# Patient Record
Sex: MALE | Race: BLACK OR AFRICAN AMERICAN | Employment: OTHER | URBAN - METROPOLITAN AREA
[De-identification: names, ages, dates, MRNs, and addresses within clinical notes are randomized per-mention and may not be internally consistent; named-entity substitution may affect disease eponyms.]

---

## 2018-08-05 ENCOUNTER — HOSPITAL ENCOUNTER (INPATIENT)
Age: 78
LOS: 2 days | Discharge: HOME | DRG: 243 | End: 2018-08-07
Attending: EMERGENCY MEDICINE | Admitting: INTERNAL MEDICINE
Payer: MEDICARE

## 2018-08-05 DIAGNOSIS — G89.18 POST-OP PAIN: ICD-10-CM

## 2018-08-05 DIAGNOSIS — R55 NEAR SYNCOPE: ICD-10-CM

## 2018-08-05 DIAGNOSIS — I44.1 MOBITZ TYPE 2 SECOND DEGREE ATRIOVENTRICULAR BLOCK: Primary | ICD-10-CM

## 2018-08-05 LAB
ALBUMIN SERPL-MCNC: 4 G/DL (ref 3.5–5)
ALBUMIN/GLOB SERPL: 0.9 {RATIO} (ref 1.1–2.2)
ALP SERPL-CCNC: 114 U/L (ref 45–117)
ALT SERPL-CCNC: 48 U/L (ref 12–78)
ANION GAP SERPL CALC-SCNC: 14 MMOL/L (ref 5–15)
AST SERPL-CCNC: 48 U/L (ref 15–37)
BASOPHILS # BLD: 0 K/UL (ref 0–0.1)
BASOPHILS NFR BLD: 0 % (ref 0–1)
BILIRUB SERPL-MCNC: 0.5 MG/DL (ref 0.2–1)
BUN SERPL-MCNC: 27 MG/DL (ref 6–20)
BUN/CREAT SERPL: 15 (ref 12–20)
CALCIUM SERPL-MCNC: 9 MG/DL (ref 8.5–10.1)
CHLORIDE SERPL-SCNC: 103 MMOL/L (ref 97–108)
CO2 SERPL-SCNC: 19 MMOL/L (ref 21–32)
CREAT SERPL-MCNC: 1.78 MG/DL (ref 0.7–1.3)
DIFFERENTIAL METHOD BLD: NORMAL
EOSINOPHIL # BLD: 0.4 K/UL (ref 0–0.4)
EOSINOPHIL NFR BLD: 6 % (ref 0–7)
ERYTHROCYTE [DISTWIDTH] IN BLOOD BY AUTOMATED COUNT: 13.7 % (ref 11.5–14.5)
GLOBULIN SER CALC-MCNC: 4.3 G/DL (ref 2–4)
GLUCOSE SERPL-MCNC: 173 MG/DL (ref 65–100)
HCT VFR BLD AUTO: 45.9 % (ref 36.6–50.3)
HGB BLD-MCNC: 14.9 G/DL (ref 12.1–17)
IMM GRANULOCYTES # BLD: 0 K/UL (ref 0–0.04)
IMM GRANULOCYTES NFR BLD AUTO: 0 % (ref 0–0.5)
LYMPHOCYTES # BLD: 2.1 K/UL (ref 0.8–3.5)
LYMPHOCYTES NFR BLD: 30 % (ref 12–49)
MCH RBC QN AUTO: 29.3 PG (ref 26–34)
MCHC RBC AUTO-ENTMCNC: 32.5 G/DL (ref 30–36.5)
MCV RBC AUTO: 90.4 FL (ref 80–99)
MONOCYTES # BLD: 0.7 K/UL (ref 0–1)
MONOCYTES NFR BLD: 10 % (ref 5–13)
NEUTS SEG # BLD: 3.7 K/UL (ref 1.8–8)
NEUTS SEG NFR BLD: 54 % (ref 32–75)
NRBC # BLD: 0 K/UL (ref 0–0.01)
NRBC BLD-RTO: 0 PER 100 WBC
PLATELET # BLD AUTO: 206 K/UL (ref 150–400)
PMV BLD AUTO: 9.5 FL (ref 8.9–12.9)
POTASSIUM SERPL-SCNC: 4.1 MMOL/L (ref 3.5–5.1)
PROT SERPL-MCNC: 8.3 G/DL (ref 6.4–8.2)
RBC # BLD AUTO: 5.08 M/UL (ref 4.1–5.7)
SODIUM SERPL-SCNC: 136 MMOL/L (ref 136–145)
TROPONIN I SERPL-MCNC: <0.05 NG/ML
TROPONIN I SERPL-MCNC: <0.05 NG/ML
WBC # BLD AUTO: 6.9 K/UL (ref 4.1–11.1)

## 2018-08-05 PROCEDURE — 74011250636 HC RX REV CODE- 250/636: Performed by: INTERNAL MEDICINE

## 2018-08-05 PROCEDURE — 80053 COMPREHEN METABOLIC PANEL: CPT | Performed by: EMERGENCY MEDICINE

## 2018-08-05 PROCEDURE — 77030010547 HC BG URIN W/UMETER -A

## 2018-08-05 PROCEDURE — 93005 ELECTROCARDIOGRAM TRACING: CPT

## 2018-08-05 PROCEDURE — 99285 EMERGENCY DEPT VISIT HI MDM: CPT

## 2018-08-05 PROCEDURE — 74011250637 HC RX REV CODE- 250/637: Performed by: INTERNAL MEDICINE

## 2018-08-05 PROCEDURE — 65620000000 HC RM CCU GENERAL

## 2018-08-05 PROCEDURE — 85025 COMPLETE CBC W/AUTO DIFF WBC: CPT | Performed by: EMERGENCY MEDICINE

## 2018-08-05 PROCEDURE — 36415 COLL VENOUS BLD VENIPUNCTURE: CPT | Performed by: INTERNAL MEDICINE

## 2018-08-05 PROCEDURE — 74011000258 HC RX REV CODE- 258: Performed by: INTERNAL MEDICINE

## 2018-08-05 PROCEDURE — 74011000250 HC RX REV CODE- 250: Performed by: INTERNAL MEDICINE

## 2018-08-05 PROCEDURE — 84484 ASSAY OF TROPONIN QUANT: CPT | Performed by: EMERGENCY MEDICINE

## 2018-08-05 RX ORDER — DOPAMINE HYDROCHLORIDE 320 MG/100ML
0-20 INJECTION, SOLUTION INTRAVENOUS
Status: DISCONTINUED | OUTPATIENT
Start: 2018-08-05 | End: 2018-08-05

## 2018-08-05 RX ORDER — CARVEDILOL 3.12 MG/1
3.12 TABLET ORAL 2 TIMES DAILY WITH MEALS
Status: ON HOLD | COMMUNITY
End: 2018-08-07

## 2018-08-05 RX ORDER — PRAVASTATIN SODIUM 40 MG/1
40 TABLET ORAL
Status: DISCONTINUED | OUTPATIENT
Start: 2018-08-05 | End: 2018-08-07 | Stop reason: HOSPADM

## 2018-08-05 RX ORDER — ISOSORBIDE MONONITRATE 30 MG/1
30 TABLET, EXTENDED RELEASE ORAL DAILY
Status: DISCONTINUED | OUTPATIENT
Start: 2018-08-06 | End: 2018-08-07 | Stop reason: HOSPADM

## 2018-08-05 RX ORDER — CHOLECALCIFEROL TAB 125 MCG (5000 UNIT) 125 MCG
40000 TAB ORAL
COMMUNITY

## 2018-08-05 RX ORDER — TRAVOPROST OPHTHALMIC SOLUTION 0.04 MG/ML
1 SOLUTION OPHTHALMIC EVERY EVENING
COMMUNITY

## 2018-08-05 RX ORDER — ONDANSETRON 2 MG/ML
4 INJECTION INTRAMUSCULAR; INTRAVENOUS
Status: DISCONTINUED | OUTPATIENT
Start: 2018-08-05 | End: 2018-08-06

## 2018-08-05 RX ORDER — DOPAMINE HYDROCHLORIDE 320 MG/100ML
0-20 INJECTION, SOLUTION INTRAVENOUS
Status: DISCONTINUED | OUTPATIENT
Start: 2018-08-05 | End: 2018-08-06

## 2018-08-05 RX ORDER — SULFAMETHOXAZOLE AND TRIMETHOPRIM 800; 160 MG/1; MG/1
1 TABLET ORAL 2 TIMES DAILY
COMMUNITY
Start: 2018-08-01 | End: 2018-08-14

## 2018-08-05 RX ORDER — GUAIFENESIN 100 MG/5ML
162 LIQUID (ML) ORAL DAILY
Status: DISCONTINUED | OUTPATIENT
Start: 2018-08-06 | End: 2018-08-07 | Stop reason: HOSPADM

## 2018-08-05 RX ORDER — CIMETIDINE 400 MG/1
400 TABLET, FILM COATED ORAL
COMMUNITY

## 2018-08-05 RX ORDER — ISOSORBIDE MONONITRATE 30 MG/1
30 TABLET, EXTENDED RELEASE ORAL DAILY
COMMUNITY

## 2018-08-05 RX ORDER — PRAVASTATIN SODIUM 40 MG/1
40 TABLET ORAL
COMMUNITY

## 2018-08-05 RX ORDER — LATANOPROST 50 UG/ML
1 SOLUTION/ DROPS OPHTHALMIC EVERY EVENING
Status: DISCONTINUED | OUTPATIENT
Start: 2018-08-05 | End: 2018-08-07 | Stop reason: HOSPADM

## 2018-08-05 RX ORDER — QUININE SULFATE 324 MG/1
324 CAPSULE ORAL
COMMUNITY

## 2018-08-05 RX ADMIN — DOPAMINE HYDROCHLORIDE IN DEXTROSE 5 MCG/KG/MIN: 3.2 INJECTION, SOLUTION INTRAVENOUS at 15:30

## 2018-08-05 RX ADMIN — LATANOPROST 1 DROP: 50 SOLUTION OPHTHALMIC at 17:39

## 2018-08-05 RX ADMIN — SACUBITRIL AND VALSARTAN 1 TABLET: 97; 103 TABLET, FILM COATED ORAL at 17:39

## 2018-08-05 RX ADMIN — ONDANSETRON 4 MG: 2 INJECTION INTRAMUSCULAR; INTRAVENOUS at 16:43

## 2018-08-05 RX ADMIN — ISOPROTERENOL HYDROCHLORIDE 1 MCG/MIN: 0.2 INJECTION, SOLUTION INTRAMUSCULAR; INTRAVENOUS at 21:41

## 2018-08-05 RX ADMIN — PRAVASTATIN SODIUM 40 MG: 40 TABLET ORAL at 22:00

## 2018-08-05 NOTE — H&P
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

## 2018-08-05 NOTE — PROGRESS NOTES
Problem: Falls - Risk of  Goal: *Absence of Falls  Document Chacorta Fall Risk and appropriate interventions in the flowsheet. Outcome: Progressing Towards Goal  Fall Risk Interventions:  Mobility Interventions: Patient to call before getting OOB                            Problem: Pressure Injury - Risk of  Goal: *Prevention of pressure injury  Document Rupert Scale and appropriate interventions in the flowsheet.    Outcome: Progressing Towards Goal  Pressure Injury Interventions:

## 2018-08-05 NOTE — PROGRESS NOTES
Problem: Falls - Risk of  Goal: *Absence of Falls  Document Chacorta Fall Risk and appropriate interventions in the flowsheet. Outcome: Progressing Towards Goal  Fall Risk Interventions:                               Problem: Pressure Injury - Risk of  Goal: *Prevention of pressure injury  Document Rupert Scale and appropriate interventions in the flowsheet.   Outcome: Progressing Towards Goal  Pressure Injury Interventions:

## 2018-08-05 NOTE — IP AVS SNAPSHOT
1111 [facility] [address] [place] [address] 
[phone] Patient: Alicia Shoemaker MRN: [id number] QFJ: About your hospitalization You were admitted on:  2018 You last received care in the:  [address] You were discharged on:  2018 Why you were hospitalized Your primary diagnosis was:  Not on File Your diagnoses also included:  Heart Block Av Second Degree Follow-up Information Follow up With Details Comments Contact Info Sloan Rosas MD On 2018 Hospital f/u PCP appointment 18 @ 1:45 p.m. [address] [place] [id number] [phone] FOLLOW UP VISIT: Dr Bora Fitzgerald office will call to schedule your pacemaker wound check. Follow up with Dr Abigail Rodriguez in 1 month [phone] Discharge Orders None A check son indicates which time of day the medication should be taken. My Medications START taking these medications Instructions Each Dose to Equal  
 Morning Noon Evening Bedtime HYDROcodone-acetaminophen 5-325 mg per tablet Commonly known as:  Bertha Kind Your last dose was: Your next dose is: Take 1 Tab by mouth every four (4) hours as needed. Max Daily Amount: 6 Tabs. Indications: Pain 1 Tab CHANGE how you take these medications Instructions Each Dose to Equal  
 Morning Noon Evening Bedtime BACTRIM -800 mg per tablet Generic drug:  trimethoprim-sulfamethoxazole What changed:  Another medication with the same name was removed. Continue taking this medication, and follow the directions you see here. Your last dose was: Your next dose is: Take 1 Tab by mouth two (2) times a day. 1 Tab  
    
   
   
   
  
 carvedilol 6.25 mg tablet Commonly known as:  Jinx Re What changed:   
- medication strength - how much to take Your last dose was: Your next dose is: Take 1 Tab by mouth two (2) times daily (with meals). 6.25 mg CONTINUE taking these medications Instructions Each Dose to Equal  
 Morning Noon Evening Bedtime  
 aspirin 81 mg chewable tablet Your last dose was: Your next dose is: Take 2 Tabs by mouth daily. 162 mg  
    
   
   
   
  
 cimetidine 400 mg tablet Commonly known as:  TAGAMET Your last dose was: Your next dose is: Take 400 mg by mouth daily as needed. 400 mg ENTRESTO  mg tablet Generic drug:  sacubitril-valsartan Your last dose was: Your next dose is: Take 1 Tab by mouth two (2) times a day. 1 Tab  
    
   
   
   
  
 isosorbide mononitrate ER 30 mg tablet Commonly known as:  IMDUR Your last dose was: Your next dose is: Take 30 mg by mouth daily. 30 mg  
    
   
   
   
  
 pravastatin 40 mg tablet Commonly known as:  PRAVACHOL Your last dose was: Your next dose is: Take 40 mg by mouth nightly. 40 mg  
    
   
   
   
  
 QUALAQUIN 324 mg capsule Generic drug:  quiNINE Your last dose was: Your next dose is: Take 324 mg by mouth nightly. 324 mg  
    
   
   
   
  
 TRAVATAN Z 0.004 % ophthalmic solution Generic drug:  travoprost  
   
Your last dose was: Your next dose is:    
   
   
 Administer 1 Drop to both eyes every evening. 1 Drop * VITAMIN D3 1,000 unit tablet Generic drug:  cholecalciferol Your last dose was: Your next dose is: Take  by mouth daily. * cholecalciferol (VITAMIN D3) 5,000 unit Tab tablet Commonly known as:  VITAMIN D3 Your last dose was: Your next dose is: Take 40,000 Units by mouth every thirty (30) days. First day of month 87768 Units * Notice: This list has 2 medication(s) that are the same as other medications prescribed for you. Read the directions carefully, and ask your doctor or other care provider to review them with you. STOP taking these medications TYLENOL EX STR ARTHRITIS PAIN 500 mg tablet Generic drug:  acetaminophen Where to Get Your Medications Information on where to get these meds will be given to you by the nurse or doctor. ! Ask your nurse or doctor about these medications  
  carvedilol 6.25 mg tablet HYDROcodone-acetaminophen 5-325 mg per tablet Opioid Education Prescription Opioids: What You Need to Know: 
 
Prescription opioids can be used to help relieve moderate-to-severe pain and are often prescribed following a surgery or injury, or for certain health conditions. These medications can be an important part of treatment but also come with serious risks. Opioids are strong pain medicines. Examples include hydrocodone, oxycodone, fentanyl, and morphine. Heroin is an example of an illegal opioid. It is important to work with your health care provider to make sure you are getting the safest, most effective care. WHAT ARE THE RISKS AND SIDE EFFECTS OF OPIOID USE? Prescription opioids carry serious risks of addiction and overdose, especially with prolonged use. An opioid overdose, often marked by slow breathing, can cause sudden death. The use of prescription opioids can have a number of side effects as well, even when taken as directed. · Tolerance-meaning you might need to take more of a medication for the same pain relief · Physical dependence-meaning you have symptoms of withdrawal when the medication is stopped. Withdrawal symptoms can include nausea, sweating, chills, diarrhea, stomach cramps, and muscle aches.   Withdrawal can last up to several weeks, depending on which drug you took and how long you took it. · Increased sensitivity to pain · Constipation · Nausea, vomiting, and dry mouth · Sleepiness and dizziness · Confusion · Depression · Low levels of testosterone that can result in lower sex drive, energy, and strength · Itching and sweating RISKS ARE GREATER WITH:      
· History of drug misuse, substance use disorder, or overdose · Mental health conditions (such as depression or anxiety) · Sleep apnea · Older age (72 years or older) · Pregnancy Avoid alcohol while taking prescription opioids. Also, unless specifically advised by your health care provider, medications to avoid include: · Benzodiazepines (such as Xanax or Valium) · Muscle relaxants (such as Soma or Flexeril) · Hypnotics (such as Ambien or Lunesta) · Other prescription opioids KNOW YOUR OPTIONS Talk to your health care provider about ways to manage your pain that don't involve prescription opioids. Some of these options may actually work better and have fewer risks and side effects. Options may include: 
· Pain relievers such as acetaminophen, ibuprofen, and naproxen · Some medications that are also used for depression or seizures · Physical therapy and exercise · Counseling to help patients learn how to cope better with triggers of pain and stress. · Application of heat or cold compress · Massage therapy · Relaxation techniques Be Informed Make sure you know the name of your medication, how much and how often to take it, and its potential risks & side effects. IF YOU ARE PRESCRIBED OPIOIDS FOR PAIN: 
· Never take opioids in greater amounts or more often than prescribed. Remember the goal is not to be pain-free but to manage your pain at a tolerable level. · Follow up with your primary care provider to: · Work together to create a plan on how to manage your pain. · Talk about ways to help manage your pain that don't involve prescription opioids. · Talk about any and all concerns and side effects. · Help prevent misuse and abuse. · Never sell or share prescription opioids · Help prevent misuse and abuse. · Store prescription opioids in a secure place and out of reach of others (this may include visitors, children, friends, and family). · Safely dispose of unused/unwanted prescription opioids: Find your community drug take-back program or your pharmacy mail-back program, or flush them down the toilet, following guidance from the Food and Drug Administration (www.fda.gov/Drugs/ResourcesForYou). · Visit www.cdc.gov/drugoverdose to learn about the risks of opioid abuse and overdose. · If you believe you may be struggling with addiction, tell your health care provider and ask for guidance or call IDRI (Infectious Disease Research Institute) at 1-606-772-DISP. Discharge Instructions Heart Blocks: Care Instructions Your Care Instructions A heart block is a problem with your heart's electrical system. Normally, a small area of the heart (sinus node) creates the electrical signals that cause the heart to beat in a timed and regular way. A heart block occurs when the signal is blocked. This disrupts the heartbeat. A heart block does not mean that blood flow to the heart is blocked. There are three types of heart blocks. In a first-degree heart block, the signal is slower than normal. But the heart rate is normal, and the heart usually is not damaged. Some medicines may cause a first-degree heart block. In a second-degree heart block, some signals do not reach the lower chambers of the heart. This can cause the heart to skip a beat or have an abnormal rhythm. In a third-degree heart block, the signal is completely blocked from reaching the lower chambers.  This can cause the heart to slow down a lot or even stop beating. It is a very serious condition. A first-degree heart block usually does not cause problems and does not need treatment. Second- and third-degree heart blocks can be treated by placing a pacemaker under your skin. The pacemaker is connected to your heart with thin wires. It helps your heart to beat in an even rhythm. Follow-up care is a key part of your treatment and safety. Be sure to make and go to all appointments, and call your doctor if you are having problems. It's also a good idea to know your test results and keep a list of the medicines you take. How can you care for yourself at home? · If you feel lightheaded, sit or lie down to avoid injury that might occur if you faint and fall. · Get regular exercise. Try for 30 minutes on most days of the week. If you do not have other heart problems, you likely do not have limits on the type or level of activity that you can do. You may want to walk, swim, bike, or do other activities. Ask your doctor what level of exercise is safe for you. · Get plenty of sleep and rest. If you get overtired, your changes in heart rate or rhythm may be more severe or occur more often. · If you received a pacemaker or an implantable cardioverter-defibrillator (ICD), you will get more information about it. · Wear medical alert jewelry that describes your condition and says you have a pacemaker or ICD. You can buy this at most drugstores. When should you call for help? Call 911 anytime you think you may need emergency care. For example, call if: 
  · You passed out (lost consciousness).  
  · You have symptoms of a heart attack. These may include: ¨ Chest pain or pressure, or a strange feeling in the chest. 
¨ Sweating. ¨ Shortness of breath. ¨ Nausea or vomiting. ¨ Pain, pressure, or a strange feeling in the back, neck, jaw, or upper belly or in one or both shoulders or arms. ¨ Lightheadedness or sudden weakness. ¨ A fast or irregular heartbeat. After you call 911, the  may tell you to chew 1 adult-strength or 2 to 4 low-dose aspirin. Wait for an ambulance. Do not try to drive yourself.  
 Call your doctor now or seek immediate medical care if: 
  · You are dizzy or lightheaded, or you feel like you may faint.  
  · You have new or increased shortness of breath.  
 Watch closely for changes in your health, and be sure to contact your doctor if you have any problems. Where can you learn more? Go to http://akiko-josselin.info/. Enter M786 in the search box to learn more about \"Heart Blocks: Care Instructions. \" Current as of: December 6, 2017 Content Version: 11.7 © 3460-0012 Open Wager. Care instructions adapted under license by Carbon Objects (which disclaims liability or warranty for this information). If you have questions about a medical condition or this instruction, always ask your healthcare professional. [place] [address] any warranty or liability for your use of this information. Red Advertising Announcement We are excited to announce that we are making your provider's discharge notes available to you in Red Advertising. You will see these notes when they are completed and signed by the physician that discharged you from your recent hospital stay. If you have any questions or concerns about any information you see in Red Advertising, please call the Health Information Department where you were seen or reach out to your Primary Care Provider for more information about your plan of care. Introducing [facility]! Cameron Gómez introduces Red Advertising patient portal. Now you can access parts of your medical record, email your doctor's office, and request medication refills online. 1. In your internet browser, go to [email or web address] 2. Click on the First Time User? Click Here link in the Sign In box. You will see the New Member Sign Up page. 3. Enter your ApogeeInvent Access Code exactly as it appears below. You will not need to use this code after youve completed the sign-up process. If you do not sign up before the expiration date, you must request a new code. · ApogeeInvent Access Code: [email or web address] Expires: 11/3/2018 12:02 PM 
 
4. Enter the last four digits of your Social Security Number (xxxx) and Date of Birth (mm/dd/yyyy) as indicated and click Submit. You will be taken to the next sign-up page. 5. Create a ApogeeInvent ID. This will be your ApogeeInvent login ID and cannot be changed, so think of one that is secure and easy to remember. 6. Create a ApogeeInvent password. You can change your password at any time. 7. Enter your Password Reset Question and Answer. This can be used at a later time if you forget your password. 8. Enter your e-mail address. You will receive e-mail notification when new information is available in [address] 9. Click Sign Up. You can now view and download portions of your medical record. 10. Click the Download Summary menu link to download a portable copy of your medical information. If you have questions, please visit the Frequently Asked Questions section of the ApogeeInvent website. Remember, ApogeeInvent is NOT to be used for urgent needs. For medical emergencies, dial 911. Now available from your iPhone and Android! Introducing Chetan Silver As a [employer] patient, I wanted to make you aware of our electronic visit tool called Chetan Shawyeceniavinayak. [employer] 24/7 allows you to connect within minutes with a medical provider 24 hours a day, seven days a week via a mobile device or tablet or logging into a secure website from your computer. You can access Chetan Silver from anywhere in the [place].  
 
A virtual visit might be right for you when you have a simple condition and feel like you just dont want to get out of bed, or cant get away from work for an appointment, when your regular [facility] provider is not available (evenings, weekends or holidays), or when youre out of town and need minor care. Electronic visits cost only $49 and if the [employer] [facility] 24/7 provider determines a prescription is needed to treat your condition, one can be electronically transmitted to a nearby pharmacy*. Please take a moment to enroll today if you have not already done so. The enrollment process is free and takes just a few minutes. To enroll, please download the Moneyspyder/Zapa linda to your tablet or phone, or visit [email or web address] to enroll on your computer. And, as an [address] [place] [address] patient with a Pure Elegance TV account, the results of your visits will be scanned into your electronic medical record and your primary care provider will be able to view the scanned results. We urge you to continue to see your regular [facility] provider for your ongoing medical care. And while your primary care provider may not be the one available when you seek a Y[employer] Intelligenceyeceniafin virtual visit, the peace of mind you get from getting a real diagnosis real time can be priceless. For more information on StartSampling, view our Frequently Asked Questions (FAQs) at [email or web address]. Sincerely, 
 
Gene Goins MD 
Chief Medical Officer [employer] *:  certain medications cannot be prescribed via [employer] Intelligenceyeceniafin Providers Seen During Your Hospitalization Provider Specialty Primary office phone Shala Orr MD Emergency Medicine [phone] René Jain MD Cardiology [phone] Your Primary Care Physician (PCP) Primary Care Physician Office Phone Office Fax Telly TOMAS [phone] You are allergic to the following No active allergies Recent Documentation Height Weight BMI Smoking Status 1.524 m 73.2 kg 31.52 kg/m2 Former Smoker Emergency Contacts Name Discharge Info Relation Home Work Mobile Monica Quiros CAREGIVER [3] Spouse [3] [phone] Patient Belongings The following personal items are in your possession at time of discharge: 
  Dental Appliances: Lowers, Uppers         Home Medications: None   Jewelry: Ring, Sent home  Clothing: With patient    Other Valuables: None Discharge Instructions Attachments/References BRADYCARDIA PACEMAKER: POST-OP (ENGLISH) Patient Handouts Pacemaker Placement: What to Expect at [facility] Your Recovery Pacemaker placement is surgery to put a pacemaker in your chest. This surgery may be done if you have bradycardia (a slow heart rate). A pacemaker is a small, battery-powered device. It sends electrical signals to the heart. These signals work to keep the heartbeat steady. Thin wires, called leads, carry the signals from the pacemaker to the heart. A pacemaker can prevent or reduce dizziness, fainting, and shortness of breath caused by a slow or unsteady heartbeat. Your chest may be sore where the doctor made the cut (incision) and put in the pacemaker. You also may have a bruise and mild swelling. These symptoms usually get better in 1 to 2 weeks. You may feel a hard ridge along the incision. This usually gets softer in the months after surgery. You may be able to see or feel the outline of the pacemaker under your skin. You will probably be able to go back to work or your usual routine 1 to 2 weeks after surgery. Pacemaker batteries usually last 5 to 15 years. Your doctor will talk to you about how often you will need to have your pacemaker checked. You can safely use most household and office electronics. This includes things such as kitchen appliances, electric power tools, and computers.  You will need to stay away from things with strong magnetic and electrical fields. This includes things such as an MRI machine (unless your pacemaker is safe for an MRI), welding equipment, and power generators. You can use a cell phone, but keep it at least 6 inches away from your pacemaker. Check with your doctor about what you need to stay away from, what you need to use with care, and what is okay to use. This care sheet gives you a general idea about how long it will take for you to recover. But each person recovers at a different pace. Follow the steps below to get better as quickly as possible. How can you care for yourself at home? Activity 
  · Rest when you feel tired.  
  · Be active. Walking is a good choice.  
  · For 4 to 6 weeks: ¨ Avoid activities that strain your chest or upper arm muscles. This includes pushing a  or vacuum, or mopping floors. It also includes swimming, or swinging a golf club or tennis racquet. ¨ Do not raise your arm (the one on the side of your body where the pacemaker is located) above your shoulder. ¨ Allow your body to heal. Don't move quickly or lift anything heavy until you are feeling better.  
  · Many people are able to return to work within 1 to 2 weeks after surgery.  
  · Ask your doctor when it is okay for you to have sex. Diet 
  · You can eat your normal diet. If your stomach is upset, try bland, low-fat foods like plain rice, broiled chicken, toast, and yogurt. Medicines 
  · Your doctor will tell you if and when you can restart your medicines. He or she will also give you instructions about taking any new medicines.  
  · If you take aspirin or some other blood thinner, be sure to talk to your doctor. He or she will tell you if and when to start taking this medicine again. Make sure that you understand exactly what your doctor wants you to do.  
  · Be safe with medicines. Read and follow all instructions on the label. ¨ If the doctor gave you a prescription medicine for pain, take it as prescribed. ¨ If you are not taking a prescription pain medicine, ask your doctor if you can take an over-the-counter medicine. ¨ Do not take aspirin, ibuprofen (Advil, Motrin), naproxen (Aleve), or other nonsteroidal anti-inflammatory drugs (NSAIDs) unless your doctor says it is okay.  
  · If your doctor prescribed antibiotics, take them as directed. Do not stop taking them just because you feel better. You need to take the full course of antibiotics. Incision care 
  · If you have strips of tape on the incision, leave the tape on for a week or until it falls off.  
  · Keep the incision dry while it heals. Your doctor may recommend sponge baths for about 7 days, but do not get the incision wet. Your doctor will let you know when you may take showers. After a shower, pat the incision dry.  
  · Don't use hydrogen peroxide or alcohol on the incision, which can slow healing. You may cover the area with a gauze bandage if it oozes fluid or rubs against clothing. Change the bandage every day.  
  · Do not take a bath or get into a hot tub for the first 2 weeks, or until your doctor tells you it is okay. Other instructions 
  · Keep a medical ID card with you at all times that says you have a pacemaker. The card should include the  and model information.  
  · Wear medical alert jewelry stating that you have a pacemaker. You can buy this at most drugstores.  
  · Check your pulse as directed by your doctor.  
  · Have your pacemaker checked as often as your doctor recommends. In some cases, this may be done over the phone or the Internet. Your doctor will give you instructions about how to do this. Follow-up care is a key part of your treatment and safety. Be sure to make and go to all appointments, and call your doctor if you are having problems. It's also a good idea to know your test results and keep a list of the medicines you take. When should you call for help? Call 911 anytime you think you may need emergency care. For example, call if: 
  · You passed out (lost consciousness).  
  · You have trouble breathing.  
 Call your doctor now or seek immediate medical care if: 
  · You are dizzy or light-headed, or you feel like you may faint.  
  · You have pain that does not get better after you take pain medicine.  
  · You hear an alarm or feel a vibration from your pacemaker.  
  · You have loose stitches, or your incision comes open.  
  · Bright red blood has soaked through the bandage over your incision.  
  · You have signs of infection, such as: 
¨ Increased pain, swelling, warmth, or redness. ¨ Red streaks leading from the incision. ¨ Pus draining from the incision. ¨ A fever.  
 Watch closely for changes in your health, and be sure to contact your doctor if: 
  · You have any problems with your pacemaker. Where can you learn more? Go to http://akiko-josselin.info/. Enter G550 in the search box to learn more about \"Pacemaker Placement: What to Expect at Home. \" Current as of: December 6, 2017 Content Version: 11.7 © 1379-1019 Flipora. Care instructions adapted under license by Anago (which disclaims liability or warranty for this information). If you have questions about a medical condition or this instruction, always ask your healthcare professional. [address] any warranty or liability for your use of this information. Please provide this summary of care documentation to your next provider. Signatures-by signing, you are acknowledging that this After Visit Summary has been reviewed with you and you have received a copy. Patient Signature:  ____________________________________________________________ Date:  ____________________________________________________________  
  
SaraHelen [facility]  Provider Signature: ____________________________________________________________ Date:  ____________________________________________________________

## 2018-08-05 NOTE — PROGRESS NOTES
1445 TRANSFER - IN REPORT: Verbal report received from Rita(name) on Justice Mina  being received from ED(unit) for routine progression of care  Report consisted of patients Situation, Background, Assessment and   Recommendations(SBAR). Information from the following report(s) SBAR, ED Summary, Intake/Output, MAR, Recent Results, Med Rec Status and Cardiac Rhythm 2nd Degree AVB Type 2 with BBB was reviewed with the receiving nurse. Opportunity for questions and clarification was provided. Assessment completed upon patients arrival to unit and care assumed. 1517 Pt arrived to unit from ED. Pt A&O x 4, c/o dizziness with movement. Pt and wife updated on plan of care and have no questions or concerns at this time. Primary Nurse Tarik Mcqueen, RN and Sona Osei RN performed a dual skin assessment on this patient No impairment noted  Rupert score is 20 Patient resting on VersaCare bed.    1520 Consent obtained for pacemaker placement    1530 Dopamine gtt started, see MAR for titration details. 1620 Pt c/o nausea with one episode of vomiting. Dr. Nidia Lott notified. Orders received for PRN Zofran and repeat Troponin. 1627 Pt converted to Sinus Teola Cheeks. 1650 Troponin drawn via peripheral venipuncture and sent to lab    1810 Pt in in out of junctional rhythm from sinus noe. Pt asymptomatic. Dr. Mercedes Gomez paged. No new orders received. 1930 Bedside shift change report given to Jaden (oncoming nurse) by Ruth (offgoing nurse). Report included the following information SBAR, Intake/Output, MAR, Recent Results and Cardiac Rhythm Sinus Teola Cheeks.

## 2018-08-05 NOTE — ROUTINE PROCESS
TRANSFER - OUT REPORT:    Verbal report given to [facility] RN(name) on Kassie Woods  being transferred to CCU(unit) for routine progression of care       Report consisted of patients Situation, Background, Assessment and   Recommendations(SBAR). Information from the following report(s) SBAR, ED Summary, [facility] and Recent Results was reviewed with the receiving nurse. Lines:   Peripheral IV 08/05/18 Left Antecubital (Active)   Site Assessment Clean, dry, & intact 8/5/2018 12:28 PM   Phlebitis Assessment 0 8/5/2018 12:28 PM   Infiltration Assessment 0 8/5/2018 12:28 PM   Dressing Status Clean, dry, & intact 8/5/2018 12:28 PM       Peripheral IV 08/05/18 Right Forearm (Active)   Site Assessment Clean, dry, & intact 8/5/2018 12:28 PM   Phlebitis Assessment 0 8/5/2018 12:28 PM   Infiltration Assessment 0 8/5/2018 12:28 PM   Dressing Status Clean, dry, & intact 8/5/2018 12:28 PM        Opportunity for questions and clarification was provided.       Patient transported with:   Monitor  Registered Nurse

## 2018-08-05 NOTE — PROGRESS NOTES
Admission Medication Reconciliation:    Information obtained from:  Patient, his wife, [facility], patient's list    Comments/Recommendations: Updated PTA meds/reviewed patient's allergies. 1)  Patient had his morning medications. Wife had a medication list that was partially updated. Used [employer] data to confirm additional medications with patient and his wife. 2)  Medication changes (since last review): Added  Armorelianena Hien (verified  with [facility])  -Travatan    -Isosorbide mononitrate  -Coreg  -Cimetidine  -Quinine   -Pravastatin    Adjusted  --Vit D3 (takes 8 tab of 5000units at the first of each month)    Removed  -Simvastatin (replaced by pravastatin)  -Plavix (D/C by provider)  -Amlodipine/benazepril (replaced by [facility])      3) Bacterium DS, per patient, prescribed recently for \"prostate infection\". Allergies:  Review of patient's allergies indicates no known allergies. Significant PMH/Disease States:   Past Medical History:   Diagnosis Date    Coagulation defects     coumadin therapy    Hypertension     Kidney stones     PUD (peptic ulcer disease) 2005    Stroke ([facility] [address] 1997, 2000    blurred vision right eye       Chief Complaint for this Admission:    Chief Complaint   Patient presents with    Nausea    Dizziness       Prior to Admission Medications:   Prior to Admission Medications   Prescriptions Last Dose Informant Patient Reported? Taking?   acetaminophen (TYLENOL EX STR ARTHRITIS PAIN) 500 mg tablet   Yes Yes   Sig: Take 1,000 mg by mouth every six (6) hours as needed. aspirin 81 mg chewable tablet 8/4/2018 at AM  No Yes   Sig: Take 2 Tabs by mouth daily. carvedilol (COREG) 3.125 mg tablet 8/5/2018 at AM  Yes Yes   Sig: Take 3.125 mg by mouth two (2) times daily (with meals). cholecalciferol (VITAMIN D3) 1,000 unit tablet   Yes No   Sig: Take  by mouth daily.    cholecalciferol, VITAMIN D3, (VITAMIN D3) 5,000 unit tab tablet 8/1/2018  Yes Yes   Sig: Take 40,000 Units by mouth every thirty (30) days. First day of month   cimetidine (TAGAMET) 400 mg tablet   Yes Yes   Sig: Take 400 mg by mouth daily as needed. isosorbide mononitrate ER (IMDUR) 30 mg tablet 8/5/2018 at Unknown time  Yes Yes   Sig: Take 30 mg by mouth daily. pravastatin (PRAVACHOL) 40 mg tablet 8/4/2018 at Unknown time  Yes Yes   Sig: Take 40 mg by mouth nightly. quiNINE (QUALAQUIN) 324 mg capsule 8/4/2018 at Unknown time  Yes Yes   Sig: Take 324 mg by mouth nightly. sacubitril-valsartan (ENTRESTO) 97 mg/103 mg tablet   Yes Yes   Sig: Take 1 Tab by mouth two (2) times a day. travoprost (TRAVATAN Z) 0.004 % ophthalmic solution 8/4/2018 at Unknown time  Yes Yes   Sig: Administer 1 Drop to both eyes every evening. trimethoprim-sulfamethoxazole (BACTRIM DS) 160-800 mg per tablet 8/5/2018 at Unknown time  Yes Yes   Sig: Take 1 Tab by mouth two (2) times a day.       Facility-Administered Medications: None

## 2018-08-05 NOTE — IP AVS SNAPSHOT
[address] [facility] [address] [place] [address] 
[phone] Patient: Oli Sender MRN: [id number] TCY:[id number] A check son indicates which time of day the medication should be taken. My Medications START taking these medications Instructions Each Dose to Equal  
 Morning Noon Evening Bedtime HYDROcodone-acetaminophen 5-325 mg per tablet Commonly known as:  Esmer Bah Your last dose was: Your next dose is: Take 1 Tab by mouth every four (4) hours as needed. Max Daily Amount: 6 Tabs. Indications: Pain 1 Tab CHANGE how you take these medications Instructions Each Dose to Equal  
 Morning Noon Evening Bedtime BACTRIM -800 mg per tablet Generic drug:  trimethoprim-sulfamethoxazole What changed:  Another medication with the same name was removed. Continue taking this medication, and follow the directions you see here. Your last dose was: Your next dose is: Take 1 Tab by mouth two (2) times a day. 1 Tab  
    
   
   
   
  
 carvedilol 6.25 mg tablet Commonly known as:  Juan Miguel Boucher What changed:   
- medication strength 
- how much to take Your last dose was: Your next dose is: Take 1 Tab by mouth two (2) times daily (with meals). 6.25 mg CONTINUE taking these medications Instructions Each Dose to Equal  
 Morning Noon Evening Bedtime  
 aspirin 81 mg chewable tablet Your last dose was: Your next dose is: Take 2 Tabs by mouth daily. 162 mg  
    
   
   
   
  
 cimetidine 400 mg tablet Commonly known as:  TAGAMET Your last dose was: Your next dose is: Take 400 mg by mouth daily as needed. 400 mg ENTRESTO  mg tablet Generic drug:  sacubitril-valsartan Your last dose was: Your next dose is: Take 1 Tab by mouth two (2) times a day. 1 Tab  
    
   
   
   
  
 isosorbide mononitrate ER 30 mg tablet Commonly known as:  IMDUR Your last dose was: Your next dose is: Take 30 mg by mouth daily. 30 mg  
    
   
   
   
  
 pravastatin 40 mg tablet Commonly known as:  PRAVACHOL Your last dose was: Your next dose is: Take 40 mg by mouth nightly. 40 mg  
    
   
   
   
  
 QUALAQUIN 324 mg capsule Generic drug:  quiNINE Your last dose was: Your next dose is: Take 324 mg by mouth nightly. 324 mg  
    
   
   
   
  
 TRAVATAN Z 0.004 % ophthalmic solution Generic drug:  travoprost  
   
Your last dose was: Your next dose is:    
   
   
 Administer 1 Drop to both eyes every evening. 1 Drop * VITAMIN D3 1,000 unit tablet Generic drug:  cholecalciferol Your last dose was: Your next dose is: Take  by mouth daily. * cholecalciferol (VITAMIN D3) 5,000 unit Tab tablet Commonly known as:  VITAMIN D3 Your last dose was: Your next dose is: Take 40,000 Units by mouth every thirty (30) days. First day of month 60243 Units * Notice: This list has 2 medication(s) that are the same as other medications prescribed for you. Read the directions carefully, and ask your doctor or other care provider to review them with you. STOP taking these medications TYLENOL EX STR ARTHRITIS PAIN 500 mg tablet Generic drug:  acetaminophen Where to Get Your Medications Information on where to get these meds will be given to you by the nurse or doctor. ! Ask your nurse or doctor about these medications  
  carvedilol 6.25 mg tablet HYDROcodone-acetaminophen 5-325 mg per tablet

## 2018-08-05 NOTE — H&P
[address]   HISTORY AND PHYSICAL      Chinmay Broderick  MR#: [id number]  : 1940  ACCOUNT #: [de-identified]   ADMIT DATE: 2018    HISTORY OF PRESENT ILLNESS:  The patient is a 49-year-old man who came here to the emergency room today because he became dizzy at [employer]. He was lightheaded but did not have syncope. He was found to be in a profound bradycardia with episodes of 2:1 AV conduction with an escape rhythm. His blood pressure is stable. He is lying flat and feels fine right now and he initially thought his symptoms were coming from his new medication which turned out to be an antibiotic that he was given for prostatism. MEDICATIONS:  His usual medications: At home at this time are as follows:  Sulfamethoxazole, trimethoprim as mentioned, cholecalciferol, aspirin, clopidogrel, amlodipine, benazepril, simvastatin and Tylenol. ALLERGIES:  NO ALLERGIES. REVIEW OF SYSTEMS:  Patient denies chest pain, diaphoresis, syncope, orthopnea, paroxysmal nocturnal dyspnea, edema or claudication. He has no other cardiac concerns at this time. Otherwise unremarkable. PAST MEDICAL HISTORY:  Includes:  Prostate cancer, kidney stones, hypertension and a nonischemic cardiomyopathy, ejection fraction in 2016 was 40%. He has also had 2 strokes. In 2013, he had circumflex artery stenting, has nonfunctioning left kidney and left bundle branch block as well. PHYSICAL EXAMINATION:  GENERAL:  Today, this is a well-developed, alert gentleman in no distress, although he does admit he still feels lightheaded and woozy. VITAL SIGNS:  As follows:  Current heart rate 37, blood pressure 148/68. HEENT:  Unremarkable. NECK:  Supple. No adenopathy. CHEST:  Nontender. No bruits on auscultation. CARDIOVASCULAR:  Normal S1, normal S2. No friction rub. Soft systolic murmur. ABDOMEN:  Soft, nontender, no bruits. No ascites or palpable masses.   NEUROLOGIC:  The patient is awake, alert, appropriate. No focal motor signs detected. LABORATORY DATA:  Normal hemogram, BUN is 27, creatinine 1.78, potassium 4.1. Troponin less than 0.05. IMPRESSION:  This patient has 2:1 heart block with a slow escape rhythm with a wide complex, type 1 indication for permanent pacing. He is hemodynamically stable. We will have him admitted to the intensive care unit, coronary care unit. If he becomes more symptomatic or bradycardia does respond to medicines, we will do a temporary pacemaker today. He understands that he will need a permanent pacemaker tomorrow. I discussed the procedure in detail with the patient and his wife at the bedside.       MD BENNIE Crain/MN  D: 08/05/2018 14:14     T: 08/05/2018 19:14  JOB #: [id number]

## 2018-08-05 NOTE — ED PROVIDER NOTES
HPI Comments: 76 y.o. male with past medical history significant for Stroke, HTN who presents from [facility] via private vehicle for evaluation s/p near syncopal episode. Pt reports 2 day hx of intermittent episodes of lightheadedness. Pt states \"It feels like I'm going to pass out\". Pt notes an episode this morning while driving to [facility]. While at [facility] symptoms worsened accompanied by nausea and diaphoresis. Pt denies hx of similar symptoms. He denies room spinning dizziness. No chest pain, shortness of breath, palpitations, syncope, fever, chills or fatigue. There are no other acute medical concerns at this time. PCP: Karen Helton MD    Note written by Clevester Jeans, Scribe, as dictated by Juliano Lombardi MD 1:25 PM    The history is provided by the patient. No  was used. Past Medical History:   Diagnosis Date    Coagulation defects     coumadin therapy    Hypertension     Kidney stones     PUD (peptic ulcer disease) 2005    Stroke ([facility] [address] 1997, 2000    blurred vision right eye       Past Surgical History:   Procedure Laterality Date    HX GI  2008    colonoscopy    HX UROLOGICAL      cysto         No family history on file. Social History     Social History    Marital status:      Spouse name: N/A    Number of children: N/A    Years of education: N/A     Occupational History    Not on file. Social History Main Topics    Smoking status: Former Smoker     Quit date: 6/14/1991    Smokeless tobacco: Not on file    Alcohol use Not on file      Comment: 1-2 drinks per year    Drug use: No    Sexual activity: Not on file     Other Topics Concern    Not on file     Social History Narrative    No narrative on file         ALLERGIES: Review of patient's allergies indicates no known allergies. Review of Systems   Constitutional: Positive for diaphoresis. Respiratory: Negative for shortness of breath.     Cardiovascular: Negative for chest pain.   Gastrointestinal: Positive for nausea. Negative for abdominal pain and vomiting. Musculoskeletal: Negative for back pain. Neurological: Positive for light-headedness. Negative for syncope. All other systems reviewed and are negative. Vitals:    08/05/18 1345 08/05/18 1400 08/05/18 1415 08/05/18 1430   BP: 115/60 139/74 148/68 140/72   Pulse: (!) 56 62 (!) 51 (!) 46   Resp:  15 14 15   Temp:    97.5 °F (36.4 °C)   SpO2: 97% 97% 98% 97%   Weight:       Height:                Physical Exam   Constitutional: He is oriented to person, place, and time. He appears well-developed and well-nourished. No distress. HENT:   Head: Normocephalic and atraumatic. Eyes: Conjunctivae are normal.   Neck: Neck supple. No tracheal deviation present. Cardiovascular: Regular rhythm and normal heart sounds. Bradycardia present. Pulmonary/Chest: Effort normal and breath sounds normal. No respiratory distress. Abdominal: He exhibits no distension. There is no tenderness. Musculoskeletal: Normal range of motion. Neurological: He is alert and oriented to person, place, and time. Skin: Skin is warm and dry. He is not diaphoretic. Psychiatric: He has a normal mood and affect. Nursing note and vitals reviewed. Note written by Elizabeth Rashid, as dictated by Fredrick Donis MD 1:51 PM    MDM    76 y.o. male presents with lightheadedness over last 2 days intermittently. HR varying from 60s to 30s with evident 2nd degree block. No NE lengthtening during sinus periods, suspect type 2 requiring pacer placement with risk of complete heart block. Asymptomatic here. Cardiology consulted for admission and will see Pt in ED. Pads placed but not hypotensive currently, pacing not indicated. ED Course       Procedures    ED EKG interpretation 1220:  Rhythm: sinus bradycardia; Rate (approx.): 41; Axis: left axis deviation; RBBB. 2 to 1 second degree block.    Note written by Elizabeth Rashid, as dictated by Kelly Arzola MD 12:20 PM    ED EKG interpretation 1259:  Rhythm: sinus bradycardia; Rate (approx.): 44; Axis: left axis deviation; RBBB. Mobitz type 2 second degree AV block with intermittent 2 to 1 block. Note written by Elizabeth Harkins, as dictated by Kelly Arzola MD 12:59 PM    CONSULT NOTE:  1:22 PM Kelly Arzola MD spoke with Dr. Eleanor Watkins, Consult for Cardiology. Discussed available diagnostic tests and clinical findings. Dr. Eleanor Watkins will come to ED and evaluate the patient. 1:52 PM  Cardiology at bedside.

## 2018-08-06 ENCOUNTER — APPOINTMENT (OUTPATIENT)
Dept: GENERAL RADIOLOGY | Age: 78
DRG: 243 | End: 2018-08-06
Attending: INTERNAL MEDICINE
Payer: MEDICARE

## 2018-08-06 LAB
ATRIAL RATE: 41 BPM
ATRIAL RATE: 58 BPM
ATRIAL RATE: 84 BPM
CALCULATED P AXIS, ECG09: 58 DEGREES
CALCULATED P AXIS, ECG09: 61 DEGREES
CALCULATED P AXIS, ECG09: 62 DEGREES
CALCULATED R AXIS, ECG10: -67 DEGREES
CALCULATED R AXIS, ECG10: -67 DEGREES
CALCULATED R AXIS, ECG10: -72 DEGREES
CALCULATED T AXIS, ECG11: -10 DEGREES
CALCULATED T AXIS, ECG11: -12 DEGREES
CALCULATED T AXIS, ECG11: 29 DEGREES
DIAGNOSIS, 93000: NORMAL
P-R INTERVAL, ECG05: 160 MS
P-R INTERVAL, ECG05: 192 MS
P-R INTERVAL, ECG05: 192 MS
Q-T INTERVAL, ECG07: 470 MS
Q-T INTERVAL, ECG07: 500 MS
Q-T INTERVAL, ECG07: 522 MS
QRS DURATION, ECG06: 136 MS
QRS DURATION, ECG06: 144 MS
QRS DURATION, ECG06: 146 MS
QTC CALCULATION (BEZET), ECG08: 401 MS
QTC CALCULATION (BEZET), ECG08: 412 MS
QTC CALCULATION (BEZET), ECG08: 441 MS
TROPONIN I SERPL-MCNC: <0.05 NG/ML
VENTRICULAR RATE, ECG03: 41 BPM
VENTRICULAR RATE, ECG03: 43 BPM
VENTRICULAR RATE, ECG03: 44 BPM

## 2018-08-06 PROCEDURE — 74011636320 HC RX REV CODE- 636/320: Performed by: INTERNAL MEDICINE

## 2018-08-06 PROCEDURE — 84484 ASSAY OF TROPONIN QUANT: CPT | Performed by: INTERNAL MEDICINE

## 2018-08-06 PROCEDURE — 74011000250 HC RX REV CODE- 250: Performed by: INTERNAL MEDICINE

## 2018-08-06 PROCEDURE — 65620000000 HC RM CCU GENERAL

## 2018-08-06 PROCEDURE — 74011250637 HC RX REV CODE- 250/637: Performed by: INTERNAL MEDICINE

## 2018-08-06 PROCEDURE — C1769 GUIDE WIRE: HCPCS

## 2018-08-06 PROCEDURE — 36415 COLL VENOUS BLD VENIPUNCTURE: CPT | Performed by: INTERNAL MEDICINE

## 2018-08-06 PROCEDURE — 77030031139 HC SUT VCRL2 J&J -A

## 2018-08-06 PROCEDURE — 74011250636 HC RX REV CODE- 250/636: Performed by: INTERNAL MEDICINE

## 2018-08-06 PROCEDURE — 77030039046 HC PAD DEFIB RADIOTRNSPNT CNMD -B

## 2018-08-06 PROCEDURE — C1898 LEAD, PMKR, OTHER THAN TRANS: HCPCS

## 2018-08-06 PROCEDURE — 77030002996 HC SUT SLK J&J -A

## 2018-08-06 PROCEDURE — 71045 X-RAY EXAM CHEST 1 VIEW: CPT

## 2018-08-06 PROCEDURE — 0JH606Z INSERTION OF PACEMAKER, DUAL CHAMBER INTO CHEST SUBCUTANEOUS TISSUE AND FASCIA, OPEN APPROACH: ICD-10-PCS | Performed by: INTERNAL MEDICINE

## 2018-08-06 PROCEDURE — 77030039266 HC ADH SKN EXOFIN S2SG -A

## 2018-08-06 PROCEDURE — 99152 MOD SED SAME PHYS/QHP 5/>YRS: CPT

## 2018-08-06 PROCEDURE — A4565 SLINGS: HCPCS

## 2018-08-06 PROCEDURE — 02H63JZ INSERTION OF PACEMAKER LEAD INTO RIGHT ATRIUM, PERCUTANEOUS APPROACH: ICD-10-PCS | Performed by: INTERNAL MEDICINE

## 2018-08-06 PROCEDURE — 02HK3JZ INSERTION OF PACEMAKER LEAD INTO RIGHT VENTRICLE, PERCUTANEOUS APPROACH: ICD-10-PCS | Performed by: INTERNAL MEDICINE

## 2018-08-06 PROCEDURE — 99153 MOD SED SAME PHYS/QHP EA: CPT

## 2018-08-06 PROCEDURE — C1785 PMKR, DUAL, RATE-RESP: HCPCS

## 2018-08-06 PROCEDURE — 33208 INSRT HEART PM ATRIAL & VENT: CPT

## 2018-08-06 PROCEDURE — C1892 INTRO/SHEATH,FIXED,PEEL-AWAY: HCPCS

## 2018-08-06 PROCEDURE — 74011000272 HC RX REV CODE- 272: Performed by: INTERNAL MEDICINE

## 2018-08-06 RX ORDER — SODIUM CHLORIDE 0.9 % (FLUSH) 0.9 %
5 SYRINGE (ML) INJECTION AS NEEDED
Status: DISCONTINUED | OUTPATIENT
Start: 2018-08-06 | End: 2018-08-06

## 2018-08-06 RX ORDER — SODIUM CHLORIDE 9 MG/ML
250 INJECTION, SOLUTION INTRAVENOUS ONCE
Status: COMPLETED | OUTPATIENT
Start: 2018-08-06 | End: 2018-08-06

## 2018-08-06 RX ORDER — ATROPINE SULFATE 0.1 MG/ML
.5-1 INJECTION INTRAVENOUS AS NEEDED
Status: DISCONTINUED | OUTPATIENT
Start: 2018-08-06 | End: 2018-08-06

## 2018-08-06 RX ORDER — FENTANYL CITRATE 50 UG/ML
25-200 INJECTION, SOLUTION INTRAMUSCULAR; INTRAVENOUS
Status: DISCONTINUED | OUTPATIENT
Start: 2018-08-06 | End: 2018-08-06

## 2018-08-06 RX ORDER — CEFAZOLIN SODIUM/WATER 2 G/20 ML
2 SYRINGE (ML) INTRAVENOUS ONCE
Status: COMPLETED | OUTPATIENT
Start: 2018-08-06 | End: 2018-08-06

## 2018-08-06 RX ORDER — MIDAZOLAM HYDROCHLORIDE 1 MG/ML
1-10 INJECTION, SOLUTION INTRAMUSCULAR; INTRAVENOUS
Status: DISCONTINUED | OUTPATIENT
Start: 2018-08-06 | End: 2018-08-06

## 2018-08-06 RX ORDER — HYDROCODONE BITARTRATE AND ACETAMINOPHEN 5; 325 MG/1; MG/1
1 TABLET ORAL
Status: DISCONTINUED | OUTPATIENT
Start: 2018-08-06 | End: 2018-08-07 | Stop reason: HOSPADM

## 2018-08-06 RX ORDER — LIDOCAINE HYDROCHLORIDE 10 MG/ML
10-30 INJECTION INFILTRATION; PERINEURAL
Status: DISCONTINUED | OUTPATIENT
Start: 2018-08-06 | End: 2018-08-06

## 2018-08-06 RX ADMIN — IOPAMIDOL 20 ML: 755 INJECTION, SOLUTION INTRAVENOUS at 07:38

## 2018-08-06 RX ADMIN — SACUBITRIL AND VALSARTAN 1 TABLET: 97; 103 TABLET, FILM COATED ORAL at 09:43

## 2018-08-06 RX ADMIN — Medication 2 G: at 07:13

## 2018-08-06 RX ADMIN — MIDAZOLAM HYDROCHLORIDE 1 MG: 1 INJECTION, SOLUTION INTRAMUSCULAR; INTRAVENOUS at 07:38

## 2018-08-06 RX ADMIN — FENTANYL CITRATE 25 MCG: 50 INJECTION, SOLUTION INTRAMUSCULAR; INTRAVENOUS at 07:14

## 2018-08-06 RX ADMIN — DOPAMINE HYDROCHLORIDE 13 MCG/KG/MIN: 320 INJECTION, SOLUTION INTRAVENOUS at 05:56

## 2018-08-06 RX ADMIN — SACUBITRIL AND VALSARTAN 1 TABLET: 97; 103 TABLET, FILM COATED ORAL at 17:31

## 2018-08-06 RX ADMIN — LIDOCAINE HYDROCHLORIDE 20 ML: 10 INJECTION, SOLUTION INFILTRATION; PERINEURAL at 07:34

## 2018-08-06 RX ADMIN — FENTANYL CITRATE 25 MCG: 50 INJECTION, SOLUTION INTRAMUSCULAR; INTRAVENOUS at 07:24

## 2018-08-06 RX ADMIN — ASPIRIN 81 MG CHEWABLE TABLET 162 MG: 81 TABLET CHEWABLE at 09:43

## 2018-08-06 RX ADMIN — LATANOPROST 1 DROP: 50 SOLUTION OPHTHALMIC at 20:50

## 2018-08-06 RX ADMIN — MIDAZOLAM HYDROCHLORIDE 2 MG: 1 INJECTION, SOLUTION INTRAMUSCULAR; INTRAVENOUS at 07:13

## 2018-08-06 RX ADMIN — ISOSORBIDE MONONITRATE 30 MG: 30 TABLET, EXTENDED RELEASE ORAL at 10:40

## 2018-08-06 RX ADMIN — FENTANYL CITRATE 25 MCG: 50 INJECTION, SOLUTION INTRAMUSCULAR; INTRAVENOUS at 07:47

## 2018-08-06 RX ADMIN — SODIUM CHLORIDE 250 ML: 900 INJECTION, SOLUTION INTRAVENOUS at 08:14

## 2018-08-06 RX ADMIN — ONDANSETRON 4 MG: 2 INJECTION INTRAMUSCULAR; INTRAVENOUS at 06:40

## 2018-08-06 RX ADMIN — FENTANYL CITRATE 25 MCG: 50 INJECTION, SOLUTION INTRAMUSCULAR; INTRAVENOUS at 07:38

## 2018-08-06 RX ADMIN — MIDAZOLAM HYDROCHLORIDE 1 MG: 1 INJECTION, SOLUTION INTRAMUSCULAR; INTRAVENOUS at 07:23

## 2018-08-06 RX ADMIN — MIDAZOLAM HYDROCHLORIDE 1 MG: 1 INJECTION, SOLUTION INTRAMUSCULAR; INTRAVENOUS at 07:47

## 2018-08-06 RX ADMIN — PRAVASTATIN SODIUM 40 MG: 40 TABLET ORAL at 22:00

## 2018-08-06 RX ADMIN — SODIUM CHLORIDE 50000 UNITS: 0.9 IRRIGANT IRRIGATION at 07:58

## 2018-08-06 NOTE — CDMP QUERY
Please clarify if this patient is (was) being treated/managed for:     => Acute kidney injury (POA) in the setting of complete heart block resulting GFR decreased by 50% requiring pacemaker placement.   => Other explanation of clinical findings  => Clinically Undetermined (no explanation for clinical findings)    The medical record reflects the following:     Risk Factors:  Pt in complete heart block on arrival with HR in 28. Clinical Indicators:  Pt /co dizziness, upon arrival HR 28 and then settled in the 40 range. Cr on arrival 1.78 w/GFR 37. Treatment: Duel pacemaker insertion, monitor labs & I/0    Please clarify and document your clinical opinion in the progress notes and discharge summary including the definitive and/or presumptive diagnosis, (suspected or probable), related to the above clinical findings. Please include clinical findings supporting your diagnosis.     Thank you,    Richy Shoemaker, RN, BSN, [facility], [address]  [phone]

## 2018-08-06 NOTE — PROGRESS NOTES
1930: Bedside shift change report given to Bob Adams RN (oncoming nurse) by Mable Fairchild RN (offgoing nurse). Report included the following information SBAR, Kardex, Intake/Output, MAR and Recent Results. 2000: CHG bath given. 2100: Patient in and out of complete heart block in the 30s, symptomatic. Dr. Carlo Galloway paged and updated. Orders received to start Isuprel at 1 mcg.   0550: Patient consistently dropping 6-7 beats every few minutes. Dr. Carlo Galloway paged and spoken to. Orders received to increase Isuprel to 2mcg/min.  0615: Second CHG bath given. 0630: Dr. Carlo Galloway and Dr. Shelton Patel at bedside, informed patient of pacemaker insertion this AM. Report given to Jin Casarez from cath lab, en route to  patient. Wife called and updated of pacemaker time. 9822: Patient nauseous and throwing up. Zofran given and Dopamine titrated down after increase in BP.

## 2018-08-06 NOTE — PROCEDURES
Procedure:  Pacemaker implant left side    Indication: Complete heart block. EF 40%. Patient underwent emergent pacemaker insertion with dual chamber pacing given no HF symptoms. If in follow up had HF symptoms or EF is low would consider CRT upgrade. PROCEDURES PERFORMED:  1. Conscious sedation. 2. Fluoroscopy for lead placement. 3. Permanent Pacemaker Implantation:      A: Medtronic Advisa SN: [id number]      B: Placement of ventricular lead with threshold testing. Lead: 5076 58 cm SN [id number]      C: Placement of atrial lead with threshold testing: Lead 5076 52 cmSN [id number]    COMPLICATIONS:None. ESTIMATED BLOOD LOSS: Minimal  SPECIMENS: None  ASSISTANTS: See MacLab    PROCEDURAL NOTE:  The patient was brought to the laboratory in a sedated postabsorptive state. Conscious sedation was administered by nursing staff under my supervision. The left chest wall was prepped and draped in the usual fashion and anesthetized with 20 mL of 2% lidocaine. Incision was made over the deltopectoral groove and extended to the level of the pectoralis fascia. A pocket was made anterior to the pectoralis fascia for in which to implant the device. Venogram noted patent axillary, subclavian and cephallic veins. Cephallic vein was used for central venous access. Using micropuncture needle and seldinger technique axillary vein was used for central venous access. Leads were positioned at the right ventriuclar apex and right atrial appendage where pacing and sensing thresholds were measured and felt to be appropriate. Both leads were then secured to the pectoralis muscle utilizing its protective sleeve with #2-0 silk ties around the lead. The pacemaker pocket was flushed with antibiotic containing sterile saline  solution. The leads were then attached to generator. Leads and generator placed in the pocket with the leads posterior to the generator.  Subcutaneous tissue closed in 2 layers utilizing #2-0 Vicryl. Skin closed with dermabond glue with an excellent final result. The patient was transferred to the holding area    No complications were noted. PACEMAKER THRESHOLD TESTIN. Ventricular R-wave: 7.0 millivolts; Ventricular capture threshold ( 0.4 milliseconds pulse width): Voltage 0.8 volts, impedance 1004 ohms, no diaphragmatic stimulation of 10 volt output. Atrial P-wave: 2.4 millivolts; Atrialcapture threshold ( 0.4 milliseconds pulse width): Voltage 0.9 volts, impedance 795 ohms, no diaphragmatic stimulation of 10 volt output. Procedure Time see log    IMPRESSION AND RECOMMENDATION:  Successful implantation of a dual chamber pacemaker. Would monitor for HF symptoms. Given prior LV dysfunction, if he develops HF symptoms or EF drops would consider CRT upgrade.

## 2018-08-06 NOTE — INTERDISCIPLINARY ROUNDS
IDR/SLIDR Summary          Patient: Murphy Greene MRN: [id number]    Age: 76 y.o. YOB: 1940 Room/Bed: [address] [place] [address]   Admit Diagnosis: Heart block AV second degree  Principal Diagnosis: <principal problem not specified>   Goals: Pacemaker tomorrow  Readmission: NO  Quality Measure: CHF  VTE Prophylaxis: Mechanical  Influenza Vaccine screening completed? YES  Pneumococcal Vaccine screening completed? YES  Mobility needs: No   Nutrition plan:Yes  Consults:Case Management    Financial concerns:No  Escalated to ? NO  RRAT Score: 10   Interventions:H2H  Testing due for pt today?  NO  LOS: 0 days Expected length of stay 2.4 days  Discharge plan: home   PCP: Christopher Casey MD  Transportation needs: No    Days before discharge:two or more days before discharge   Discharge disposition: Home    Signed:     Shin Pascal RN  8/5/2018  8:29 PM

## 2018-08-06 NOTE — PROGRESS NOTES
Problem: Falls - Risk of  Goal: *Absence of Falls  Document Chacorta Fall Risk and appropriate interventions in the flowsheet. Outcome: Progressing Towards Goal  Fall Risk Interventions:  Mobility Interventions: Patient to call before getting OOB         Medication Interventions: Assess postural VS orthostatic hypotension, Evaluate medications/consider consulting pharmacy, Patient to call before getting OOB                  Problem: Pressure Injury - Risk of  Goal: *Prevention of pressure injury  Document Rupert Scale and appropriate interventions in the flowsheet.    Outcome: Progressing Towards Goal  Pressure Injury Interventions:

## 2018-08-06 NOTE — PROGRESS NOTES
0730: Report received from [facility], [address]: TRANSFER - IN REPORT:    Verbal report received from Myrtie Lombard, RN (name) on [facility]  being received from [facility] (unit) for routine progression of care      Report consisted of patients Situation, Background, Assessment and   Recommendations(SBAR). Information from the following report(s) SBAR, Kardex, ED Summary, Procedure Summary, Intake/Output, MAR, Recent Results and Cardiac Rhythm Vpaced was reviewed with the receiving nurse. Opportunity for questions and clarification was provided. Assessment completed upon patients arrival to unit and care assumed. 0831: Pt arrived to CCU 20.     1930: Bedside shift change report given to [facility], RN (oncoming nurse) by Guevara Allan RN (offgoing nurse). Report included the following information SBAR, Kardex, ED Summary, Procedure Summary, Intake/Output, MAR, Recent Results, Med Rec Status and Cardiac Rhythm V paced.

## 2018-08-06 NOTE — PROGRESS NOTES
Problem: Discharge Planning  Goal: *Discharge to safe environment  Outcome: Progressing Towards Goal  See CM Notes CRM: Matthew Godfrey MPH; Z: [phone]

## 2018-08-06 NOTE — PROGRESS NOTES
Cardiac Cath Lab Procedure Area Arrival Note:    Caprice Chaney arrived to Cardiac Cath Lab, Procedure Area. Patient identifiers verified with NAME and DATE OF BIRTH. Procedure verified with patient. Consent forms verified. Allergies verified. Patient informed of procedure and plan of care. Questions answered with review. Patient voiced understanding of procedure and plan of care. Patient on cardiac monitor, non-invasive blood pressure, SPO2 monitor. On RA and placed on O2 @ 2 lpm via NC.  IV of NSS on pump at 25 ml/hr. Patient status doing well without problems. Patient is A&Ox 4. Patient reports no complaints of pain or shortness of breath. Patient medicated during procedure with orders obtained and verified by Dr. Carlo Galloway. Refer to patients Cardiac Cath Lab PROCEDURE REPORT for vital signs, assessment, status, and response during procedure, printed at end of case. Printed report on chart or scanned into chart. 0820 TRANSFER - OUT REPORT:    Verbal report given to Mikie Jaramillo CCU RN(name) on Caprice Chaney  being transferred to CCU(unit) for routine progression of care       Report consisted of patients Situation, Background, Assessment and   Recommendations(SBAR). Information from the following report(s) SBAR, Procedure Summary and MAR was reviewed with the receiving nurse.     Lines:   Peripheral IV 08/05/18 Left Antecubital (Active)   Site Assessment Clean, dry, & intact 8/6/2018  4:00 AM   Phlebitis Assessment 0 8/6/2018  4:00 AM   Infiltration Assessment 0 8/6/2018  4:00 AM   Dressing Status Clean, dry, & intact 8/6/2018  4:00 AM   Dressing Type Transparent 8/6/2018  4:00 AM   Hub Color/Line Status Green 8/6/2018  4:00 AM   Action Taken Open ports on tubing capped 8/6/2018  4:00 AM   Alcohol Cap Used Yes 8/6/2018  4:00 AM       Peripheral IV 08/05/18 Right Forearm (Active)   Site Assessment Clean, dry, & intact 8/6/2018  4:00 AM   Phlebitis Assessment 0 8/6/2018  4:00 AM   Infiltration Assessment 0 8/6/2018  4:00 AM   Dressing Status Clean, dry, & intact 8/6/2018  4:00 AM   Dressing Type Transparent 8/6/2018  4:00 AM   Hub Color/Line Status Pink 8/6/2018  4:00 AM   Action Taken Open ports on tubing capped 8/6/2018  4:00 AM   Alcohol Cap Used Yes 8/6/2018  4:00 AM        Opportunity for questions and clarification was provided.       Patient transported with:   Monitor  Registered Nurse  Tech

## 2018-08-06 NOTE — PROGRESS NOTES
Reason for Admission:   \"Blacking out, couldn't see\"; Heart block                   RRAT Score:          10           Plan for utilizing home health:      None; unlikely                    Likelihood of Readmission:  Low                         Transition of Care Plan:                Patient received a pacemaker today, 8/6/18. Patient will likely transfer to a stepdown unit today, 8/6/18 and discharge tomorrow, 8/7/18. Patient has no hx of HH, SNF, or IPR. Patient identified pharmacy preference [facility]. Care Management Interventions  PCP Verified by CM: Yes (Last saw Dr. Heydi Edge in June)  Palliative Care Criteria Met (RRAT>21 & CHF Dx)?: No  Mode of Transport at Discharge:  Other (see comment) (Spouse will transport at discharge)  Transition of Care Consult (CM Consult): Discharge Planning  MyChart Signup: No  Discharge Durable Medical Equipment: No (Has no DME)  Health Maintenance Reviewed: Yes (CM met with patient with patient alert and oriented x 4)  Physical Therapy Consult: No  Occupational Therapy Consult: No  Speech Therapy Consult: No  Current Support Network: Lives with Spouse, Family Lives [place], Own Home  Confirm Follow Up Transport: Self (Independent in ADLs to include driving)  Plan discussed with Pt/Family/Caregiver: Yes  [employer] Information Provided?: No  Discharge Location  Discharge Placement: Home with family assistance (Lives in a single story home with 3 external steps with spouse)

## 2018-08-07 ENCOUNTER — APPOINTMENT (OUTPATIENT)
Dept: GENERAL RADIOLOGY | Age: 78
DRG: 243 | End: 2018-08-07
Attending: INTERNAL MEDICINE
Payer: MEDICARE

## 2018-08-07 VITALS
SYSTOLIC BLOOD PRESSURE: 130 MMHG | RESPIRATION RATE: 19 BRPM | BODY MASS INDEX: 31.68 KG/M2 | TEMPERATURE: 98 F | OXYGEN SATURATION: 95 % | HEART RATE: 77 BPM | HEIGHT: 60 IN | WEIGHT: 161.38 LBS | DIASTOLIC BLOOD PRESSURE: 89 MMHG

## 2018-08-07 PROCEDURE — 74011250637 HC RX REV CODE- 250/637: Performed by: INTERNAL MEDICINE

## 2018-08-07 PROCEDURE — 71046 X-RAY EXAM CHEST 2 VIEWS: CPT

## 2018-08-07 RX ORDER — HYDROCODONE BITARTRATE AND ACETAMINOPHEN 5; 325 MG/1; MG/1
1 TABLET ORAL
Qty: 30 TAB | Refills: 0 | Status: SHIPPED | OUTPATIENT
Start: 2018-08-07

## 2018-08-07 RX ORDER — CARVEDILOL 6.25 MG/1
6.25 TABLET ORAL 2 TIMES DAILY WITH MEALS
Qty: 60 TAB | Refills: 5 | Status: SHIPPED | OUTPATIENT
Start: 2018-08-07

## 2018-08-07 RX ADMIN — SACUBITRIL AND VALSARTAN 1 TABLET: 97; 103 TABLET, FILM COATED ORAL at 08:13

## 2018-08-07 RX ADMIN — ASPIRIN 81 MG CHEWABLE TABLET 162 MG: 81 TABLET CHEWABLE at 08:13

## 2018-08-07 RX ADMIN — ISOSORBIDE MONONITRATE 30 MG: 30 TABLET, EXTENDED RELEASE ORAL at 08:13

## 2018-08-07 NOTE — DISCHARGE INSTRUCTIONS
Heart Blocks: Care Instructions  Your Care Instructions    A heart block is a problem with your heart's electrical system. Normally, a small area of the heart (sinus node) creates the electrical signals that cause the heart to beat in a timed and regular way. A heart block occurs when the signal is blocked. This disrupts the heartbeat. A heart block does not mean that blood flow to the heart is blocked. There are three types of heart blocks. In a first-degree heart block, the signal is slower than normal. But the heart rate is normal, and the heart usually is not damaged. Some medicines may cause a first-degree heart block. In a second-degree heart block, some signals do not reach the lower chambers of the heart. This can cause the heart to skip a beat or have an abnormal rhythm. In a third-degree heart block, the signal is completely blocked from reaching the lower chambers. This can cause the heart to slow down a lot or even stop beating. It is a very serious condition. A first-degree heart block usually does not cause problems and does not need treatment. Second- and third-degree heart blocks can be treated by placing a pacemaker under your skin. The pacemaker is connected to your heart with thin wires. It helps your heart to beat in an even rhythm. Follow-up care is a key part of your treatment and safety. Be sure to make and go to all appointments, and call your doctor if you are having problems. It's also a good idea to know your test results and keep a list of the medicines you take. How can you care for yourself at home? · If you feel lightheaded, sit or lie down to avoid injury that might occur if you faint and fall. · Get regular exercise. Try for 30 minutes on most days of the week. If you do not have other heart problems, you likely do not have limits on the type or level of activity that you can do. You may want to walk, swim, bike, or do other activities.  Ask your doctor what level of exercise is safe for you. · Get plenty of sleep and rest. If you get overtired, your changes in heart rate or rhythm may be more severe or occur more often. · If you received a pacemaker or an implantable cardioverter-defibrillator (ICD), you will get more information about it. · Wear medical alert jewelry that describes your condition and says you have a pacemaker or ICD. You can buy this at most drugstores. When should you call for help? Call 911 anytime you think you may need emergency care. For example, call if:    · You passed out (lost consciousness).     · You have symptoms of a heart attack. These may include:  ¨ Chest pain or pressure, or a strange feeling in the chest.  ¨ Sweating. ¨ Shortness of breath. ¨ Nausea or vomiting. ¨ Pain, pressure, or a strange feeling in the back, neck, jaw, or upper belly or in one or both shoulders or arms. ¨ Lightheadedness or sudden weakness. ¨ A fast or irregular heartbeat. After you call 911, the  may tell you to chew 1 adult-strength or 2 to 4 low-dose aspirin. Wait for an ambulance. Do not try to drive yourself.    Call your doctor now or seek immediate medical care if:    · You are dizzy or lightheaded, or you feel like you may faint.     · You have new or increased shortness of breath.    Watch closely for changes in your health, and be sure to contact your doctor if you have any problems. Where can you learn more? Go to http://akiko-josselin.info/. Enter X012 in the search box to learn more about \"Heart Blocks: Care Instructions. \"  Current as of: December 6, 2017  Content Version: 11.7  © 4003-2005 [employer]. Care instructions adapted under license by [employer] (which disclaims liability or warranty for this information).  If you have questions about a medical condition or this instruction, always ask your healthcare professional. [place] [address] any warranty or liability for your use of this information.

## 2018-08-07 NOTE — PROGRESS NOTES
0795 Received verbal bedside report from [facility]. Assumed care of the pt.    [id number] Radiology called for estimated time for PA Lat CXR. States they will call around 9 for estimated time. 1000 Pt transport here to assist pt to radiology via wheelchair. 1020 Pt returned from radiology, pt tolerated well. 1230 Discharge instructions were reviewed and given to the pt and his wife. Patient given a current medication reconciliation form and verbalized understanding of their medications, side affects, medication administration, and time when due. Importance of follow-up and appointment times and dates reviewed. The patient verbalized understanding of discharge instructions and prescriptions, all questions were answered. Mr. Ayad Dey has no further concerns at this time. Patient stable at time of discharge. The RN used   wheelchair transport to patient  area.

## 2018-08-07 NOTE — PROGRESS NOTES
1930: Bedside shift change report given to MARY Stanley (oncoming nurse) by Neftaly Tinsley RN (offgoing nurse). Report included the following information SBAR, Kardex, Intake/Output, MAR and Recent Results. 2000: CHG bath refused. 0730: Bedside shift change report given to MARY Stokes (oncoming nurse) by MARY Stanley (offgoing nurse). Report included the following information SBAR, Kardex, Intake/Output, MAR and Recent Results.

## 2018-08-07 NOTE — PROGRESS NOTES
Hospital follow-up PCP transitional care appointment has been scheduled with Dr. Murphy Rausch for Thursday, 8/9/18 at 1:45 p.m. Pending patient discharge.   Finn Montes, Care Management Specialist.

## 2018-08-07 NOTE — PROGRESS NOTES
Problem: Falls - Risk of  Goal: *Absence of Falls  Document Chacorta Fall Risk and appropriate interventions in the flowsheet. Outcome: Progressing Towards Goal  Fall Risk Interventions:  Mobility Interventions: Patient to call before getting OOB         Medication Interventions: Assess postural VS orthostatic hypotension                  Problem: Pressure Injury - Risk of  Goal: *Prevention of pressure injury  Document Rupert Scale and appropriate interventions in the flowsheet.    Outcome: Progressing Towards Goal  Pressure Injury Interventions:

## 2018-08-07 NOTE — INTERDISCIPLINARY ROUNDS
IDR/SLIDR Summary          Patient: Felix Ward MRN: [id number]    Age: 76 y.o. YOB: 1940 Room/Bed: [address] [place] [address]   Admit Diagnosis: Heart block AV second degree  Principal Diagnosis: <principal problem not specified>   Goals: Stable vital signs and transfer  Readmission: NO  Quality Measure: CHF  VTE Prophylaxis: Mechanical  Influenza Vaccine screening completed? YES  Pneumococcal Vaccine screening completed? YES  Mobility needs: No   Nutrition plan:Yes  Consults:Case Management    Financial concerns:No  Escalated to ? NO  RRAT Score: 10   Interventions:H2H  Testing due for pt today?  NO  LOS: 1 days Expected length of stay 2.4 days  Discharge plan: home   PCP: Hua Hernandez MD  Transportation needs: No    Days before discharge:two or more days before discharge   Discharge disposition: Home    Signed:     Norm Hightower RN  8/6/2018  8:29 PM

## 2018-08-07 NOTE — PROGRESS NOTES
Normal next day post op check. Thresholds and sensing are appropriate. Vp99%. F/u wound check in 2 weeks. Office will call to arrange.

## 2018-08-08 NOTE — DISCHARGE SUMMARY
[address]    DISCHARGE SUMMARY    Ivelisse Ware  MR#: [id number]  : 1940  ACCOUNT #: [de-identified]   ADMIT DATE: 2018  DISCHARGE DATE: 2018    DIAGNOSES:  1. Sick sinus syndrome with complete heart block with near syncope. 2.  Nonischemic cardiomyopathy. 3.  Coronary artery disease, stable. 4.  Hypertension. HOSPITAL COURSE:  Patient was admitted  with profound bradycardia. He is exhibiting Mobitz second degree AV block type 2 with a rate of 39 beats per minute, is very symptomatic. He was driving at the time. He was admitted and placed in the CCU and had subsequent deterioration of his heart rhythm with pauses of up to 7 seconds. Promptly had a pacemaker put in yesterday morning without incident and now is doing well. He will be discharged home for followup with Dr. Elissa Boogie, the physician who implanted his pacemaker and for me for his usual cardiac followup. The patient was otherwise well and has no other cardiac concerns at this time. On morning of discharge, he was awake, alert, stable. His wound is intact. His postop chest x-ray demonstrated no pneumothorax. His physical examination is stable and unchanged from admission. MEDICATIONS:  Are as follows:  Aspirin 81 mg daily, Bactrim-DS 1 tab 2 times a day, carvedilol 6.25 twice a day, cimetidine 400 mg daily, Entresto 97/103 twice a day, hydrocodone/acetaminophen 1 every 6 hours as needed for pain, isosorbide mononitrate 30 mg daily, pravastatin 40 mg nightly, quinine 325 mg nightly, Travatan eyedrops, vitamin D3, cholecalciferol. Follow up as indicated.       DISPOSITION:  home      MD BENNIE Lyles/  D: 2018 08:02     T: 2018 08:29  JOB #: [id number]

## 2018-08-17 NOTE — PROGRESS NOTES
CDMP Query response:   Acute kidney injury due to complete heart block with profound bradycardia and resultant hypoperfusion of the kidneys.

## 2018-08-26 ENCOUNTER — HOSPITAL ENCOUNTER (EMERGENCY)
Age: 78
Discharge: HOME | End: 2018-08-26
Attending: EMERGENCY MEDICINE
Payer: MEDICARE

## 2018-08-26 VITALS
HEART RATE: 64 BPM | TEMPERATURE: 97.8 F | RESPIRATION RATE: 18 BRPM | SYSTOLIC BLOOD PRESSURE: 133 MMHG | BODY MASS INDEX: 33.02 KG/M2 | OXYGEN SATURATION: 97 % | HEIGHT: 60 IN | DIASTOLIC BLOOD PRESSURE: 69 MMHG | WEIGHT: 168.2 LBS

## 2018-08-26 DIAGNOSIS — R21 RASH: Primary | ICD-10-CM

## 2018-08-26 PROCEDURE — 74011636637 HC RX REV CODE- 636/637: Performed by: PHYSICIAN ASSISTANT

## 2018-08-26 PROCEDURE — 99283 EMERGENCY DEPT VISIT LOW MDM: CPT

## 2018-08-26 RX ORDER — PREDNISONE 20 MG/1
40 TABLET ORAL DAILY
Qty: 8 TAB | Refills: 0 | Status: SHIPPED | OUTPATIENT
Start: 2018-08-26 | End: 2018-08-30

## 2018-08-26 RX ORDER — PREDNISONE 20 MG/1
60 TABLET ORAL ONCE
Status: COMPLETED | OUTPATIENT
Start: 2018-08-26 | End: 2018-08-26

## 2018-08-26 RX ADMIN — PREDNISONE 60 MG: 20 TABLET ORAL at 12:44

## 2018-08-26 NOTE — DISCHARGE INSTRUCTIONS

## 2018-08-26 NOTE — ED PROVIDER NOTES
HPI Comments: 76year old male presenting for pruritis. Pt reports about one week of bilateral leg pruritis and occasional burning. No swelling. Unsure of new exposures. No new medications. No treatment PTA. No CP, SOB, or other concerns. Admitst 8/5 for heart block, had pacemaker placed. PMHx: as above, also CVA, HTN, PUD, on coumadin, kidney stones, bradycardia  Social:     Patient is a 76 y.o. male presenting with leg pain. The history is provided by the patient, medical records and the spouse. Leg Pain           Past Medical History:   Diagnosis Date    Bradycardia     Coagulation defects     coumadin therapy    Hypertension     Kidney stones     PUD (peptic ulcer disease) 2005    Stroke ([facility] [address] 1997, 2000    blurred vision right eye       Past Surgical History:   Procedure Laterality Date    HX GI  2008    colonoscopy    HX PACEMAKER      HX UROLOGICAL      cysto         History reviewed. No pertinent family history. Social History     Social History    Marital status:      Spouse name: N/A    Number of children: N/A    Years of education: N/A     Occupational History    Not on file. Social History Main Topics    Smoking status: Former Smoker     Quit date: 6/14/1991    Smokeless tobacco: Never Used    Alcohol use No      Comment: 1-2 drinks per year    Drug use: No    Sexual activity: Not on file     Other Topics Concern    Not on file     Social History Narrative         ALLERGIES: Review of patient's allergies indicates no known allergies. Review of Systems   Constitutional: Negative for fever. HENT: Negative for congestion. Eyes: Negative for discharge. Respiratory: Negative for shortness of breath. Cardiovascular: Negative for chest pain. Gastrointestinal: Negative for diarrhea and vomiting. Musculoskeletal: Negative for neck stiffness. Skin: Positive for rash. Negative for wound. Neurological: Negative for syncope.    All other systems reviewed and are negative. Vitals:    08/26/18 1155   BP: 159/90   Pulse: 79   Resp: 18   Temp: 97.5 °F (36.4 °C)   SpO2: 97%   Weight: 76.3 kg (168 lb 3.2 oz)   Height: 5' (1.524 m)            Physical Exam   Constitutional: He is oriented to person, place, and time. He appears well-developed and well-nourished. No distress. Pleasant AA male   HENT:   Head: Normocephalic and atraumatic. Right Ear: External ear normal.   Left Ear: External ear normal.   Eyes: Conjunctivae are normal. No scleral icterus. Neck: Neck supple. No tracheal deviation present. Cardiovascular: Normal rate, regular rhythm and normal heart sounds. Exam reveals no gallop and no friction rub. No murmur heard. Pulmonary/Chest: Effort normal and breath sounds normal. No stridor. No respiratory distress. He has no wheezes. Abdominal: Soft. He exhibits no distension. Musculoskeletal: Normal range of motion. Neurological: He is alert and oriented to person, place, and time. Skin: Skin is warm and dry. Erythematous, raised rash noted to both medial thighs  Distal pulses intact  No cellulitic changes   Psychiatric: He has a normal mood and affect. His behavior is normal.   Nursing note and vitals reviewed. MDM  Number of Diagnoses or Management Options  Diagnosis management comments: 76year old male presenting for burning, pruritic rash to both medial thighs. No cellulitic changes, no fever or chills. Discussed possible contact dermatitis vs. idiopathic urticaria. Will do short course of prednisone, encouraged PCP f/u this week, return precautions given.          Amount and/or Complexity of Data Reviewed  Discuss the patient with other providers: yes (Dr. Richi Cox, ED attending)          ED Course       Procedures

## 2019-06-20 ENCOUNTER — ANESTHESIA EVENT (OUTPATIENT)
Dept: ENDOSCOPY | Age: 79
End: 2019-06-20
Payer: MEDICARE

## 2019-06-20 NOTE — ANESTHESIA PREPROCEDURE EVALUATION
Relevant Problems   No relevant active problems       Anesthetic History   No history of anesthetic complications            Review of Systems / Medical History  Patient summary reviewed, nursing notes reviewed and pertinent labs reviewed    Pulmonary  Within defined limits                 Neuro/Psych       CVA       Cardiovascular    Hypertension        Dysrhythmias   Pacemaker         GI/Hepatic/Renal           PUD     Endo/Other        Obesity     Other Findings              Physical Exam    Airway  Mallampati: III  TM Distance: > 6 cm  Neck ROM: normal range of motion   Mouth opening: Normal     Cardiovascular    Rhythm: regular  Rate: normal         Dental    Dentition: Full lower dentures and Full upper dentures     Pulmonary  Breath sounds clear to auscultation               Abdominal  GI exam deferred       Other Findings            Anesthetic Plan    ASA: 3  Anesthesia type: MAC            Anesthetic plan and risks discussed with: Patient

## 2019-06-21 ENCOUNTER — ANESTHESIA (OUTPATIENT)
Dept: ENDOSCOPY | Age: 79
End: 2019-06-21
Payer: MEDICARE

## 2019-06-21 ENCOUNTER — HOSPITAL ENCOUNTER (OUTPATIENT)
Age: 79
Setting detail: OUTPATIENT SURGERY
Discharge: HOME | End: 2019-06-21
Attending: INTERNAL MEDICINE | Admitting: INTERNAL MEDICINE
Payer: MEDICARE

## 2019-06-21 VITALS
WEIGHT: 170 LBS | BODY MASS INDEX: 23.8 KG/M2 | HEIGHT: 71 IN | HEART RATE: 66 BPM | RESPIRATION RATE: 16 BRPM | TEMPERATURE: 97.5 F | OXYGEN SATURATION: 94 % | DIASTOLIC BLOOD PRESSURE: 77 MMHG | SYSTOLIC BLOOD PRESSURE: 133 MMHG

## 2019-06-21 PROCEDURE — 77030021593 HC FCPS BIOP ENDOSC BSC -A: Performed by: INTERNAL MEDICINE

## 2019-06-21 PROCEDURE — 76060000032 HC ANESTHESIA 0.5 TO 1 HR: Performed by: INTERNAL MEDICINE

## 2019-06-21 PROCEDURE — 76040000007: Performed by: INTERNAL MEDICINE

## 2019-06-21 PROCEDURE — 74011250636 HC RX REV CODE- 250/636

## 2019-06-21 PROCEDURE — 77030027957 HC TBNG IRR ENDOGTR BUSS -B: Performed by: INTERNAL MEDICINE

## 2019-06-21 PROCEDURE — 88305 TISSUE EXAM BY PATHOLOGIST: CPT

## 2019-06-21 RX ORDER — SODIUM CHLORIDE 0.9 % (FLUSH) 0.9 %
5-40 SYRINGE (ML) INJECTION AS NEEDED
Status: DISCONTINUED | OUTPATIENT
Start: 2019-06-21 | End: 2019-06-21 | Stop reason: HOSPADM

## 2019-06-21 RX ORDER — FENTANYL CITRATE 50 UG/ML
25-200 INJECTION, SOLUTION INTRAMUSCULAR; INTRAVENOUS
Status: DISCONTINUED | OUTPATIENT
Start: 2019-06-21 | End: 2019-06-21 | Stop reason: HOSPADM

## 2019-06-21 RX ORDER — NALOXONE HYDROCHLORIDE 0.4 MG/ML
0.4 INJECTION, SOLUTION INTRAMUSCULAR; INTRAVENOUS; SUBCUTANEOUS
Status: DISCONTINUED | OUTPATIENT
Start: 2019-06-21 | End: 2019-06-21 | Stop reason: HOSPADM

## 2019-06-21 RX ORDER — PROPOFOL 10 MG/ML
INJECTION, EMULSION INTRAVENOUS AS NEEDED
Status: DISCONTINUED | OUTPATIENT
Start: 2019-06-21 | End: 2019-06-21 | Stop reason: HOSPADM

## 2019-06-21 RX ORDER — DEXTROMETHORPHAN/PSEUDOEPHED 2.5-7.5/.8
1.2 DROPS ORAL
Status: DISCONTINUED | OUTPATIENT
Start: 2019-06-21 | End: 2019-06-21 | Stop reason: HOSPADM

## 2019-06-21 RX ORDER — FLUMAZENIL 0.1 MG/ML
0.2 INJECTION INTRAVENOUS
Status: DISCONTINUED | OUTPATIENT
Start: 2019-06-21 | End: 2019-06-21 | Stop reason: HOSPADM

## 2019-06-21 RX ORDER — SODIUM CHLORIDE 9 MG/ML
INJECTION, SOLUTION INTRAVENOUS
Status: DISCONTINUED | OUTPATIENT
Start: 2019-06-21 | End: 2019-06-21 | Stop reason: HOSPADM

## 2019-06-21 RX ORDER — SODIUM CHLORIDE 0.9 % (FLUSH) 0.9 %
5-40 SYRINGE (ML) INJECTION EVERY 8 HOURS
Status: DISCONTINUED | OUTPATIENT
Start: 2019-06-21 | End: 2019-06-21 | Stop reason: HOSPADM

## 2019-06-21 RX ORDER — LIDOCAINE HYDROCHLORIDE 20 MG/ML
INJECTION, SOLUTION EPIDURAL; INFILTRATION; INTRACAUDAL; PERINEURAL AS NEEDED
Status: DISCONTINUED | OUTPATIENT
Start: 2019-06-21 | End: 2019-06-21 | Stop reason: HOSPADM

## 2019-06-21 RX ORDER — SODIUM CHLORIDE 9 MG/ML
50 INJECTION, SOLUTION INTRAVENOUS CONTINUOUS
Status: DISCONTINUED | OUTPATIENT
Start: 2019-06-21 | End: 2019-06-21 | Stop reason: HOSPADM

## 2019-06-21 RX ORDER — ATROPINE SULFATE 0.1 MG/ML
0.5 INJECTION INTRAVENOUS
Status: DISCONTINUED | OUTPATIENT
Start: 2019-06-21 | End: 2019-06-21 | Stop reason: HOSPADM

## 2019-06-21 RX ORDER — EPINEPHRINE 0.1 MG/ML
1 INJECTION INTRACARDIAC; INTRAVENOUS
Status: DISCONTINUED | OUTPATIENT
Start: 2019-06-21 | End: 2019-06-21 | Stop reason: HOSPADM

## 2019-06-21 RX ADMIN — PROPOFOL 30 MG: 10 INJECTION, EMULSION INTRAVENOUS at 17:11

## 2019-06-21 RX ADMIN — SODIUM CHLORIDE: 9 INJECTION, SOLUTION INTRAVENOUS at 16:25

## 2019-06-21 RX ADMIN — PROPOFOL 20 MG: 10 INJECTION, EMULSION INTRAVENOUS at 17:25

## 2019-06-21 RX ADMIN — PROPOFOL 20 MG: 10 INJECTION, EMULSION INTRAVENOUS at 17:13

## 2019-06-21 RX ADMIN — PROPOFOL 20 MG: 10 INJECTION, EMULSION INTRAVENOUS at 17:22

## 2019-06-21 RX ADMIN — PROPOFOL 50 MG: 10 INJECTION, EMULSION INTRAVENOUS at 17:09

## 2019-06-21 RX ADMIN — PROPOFOL 20 MG: 10 INJECTION, EMULSION INTRAVENOUS at 17:16

## 2019-06-21 RX ADMIN — LIDOCAINE HYDROCHLORIDE 50 MG: 20 INJECTION, SOLUTION EPIDURAL; INFILTRATION; INTRACAUDAL; PERINEURAL at 17:09

## 2019-06-21 RX ADMIN — PROPOFOL 20 MG: 10 INJECTION, EMULSION INTRAVENOUS at 17:19

## 2019-06-21 NOTE — PROCEDURES
[address]  [facility], [address] [place] [address]        Colonoscopy Operative Report    Melody Santos  [id number]  1940      Procedure Type:   Colonoscopy with polypectomy (cold biopsy)     Indications:    Personal history of colon polyps (screening only)         Pre-operative Diagnosis: see indication above    Post-operative Diagnosis:  See findings below    :  Sandi Adams MD      Referring Provider: Eliel Wilkins MD      Sedation:  MAC anesthesia Propofol      Procedure Details:  After informed consent was obtained with all risks and benefits of procedure explained and preoperative exam completed, the patient was taken to the endoscopy suite and placed in the left lateral decubitus position. Upon sequential sedation as per above, a digital rectal exam was performed demonstrating internal hemorrhoids. The Olympus pediatric videocolonoscope  was inserted in the rectum and carefully advanced to the cecum, which was identified by the ileocecal valve and appendiceal orifice. The cecum was identified by the ileocecal valve and appendiceal orifice. The quality of preparation was good. The colonoscope was slowly withdrawn with careful evaluation between folds. Retroflexion in the rectum was completed . Findings:   Rectum: normal  Sigmoid: normal  Descending Colon: normal  Transverse Colon: two sessile polyps 3-4 mm, removed with cold biopsy forceps  Ascending Colon: normal  Cecum: single sessile 2 mm polyp, removed with cold biopsy forceps  Terminal Ileum: not intubated      Specimen Removed: 1. Transverse colon polyps, 2. Cecal polyp    Complications: None. EBL:  None. Impression:     1. Colon polyps - removed  2. Small internal hemorrhoids    Recommendations:  1. Follow up surgical pathology  2. High fiber diet education  3. Consider repeat colonoscopy in 5 years based on general health    Signed By: Sandi Adams MD     6/21/2019  5:40 PM

## 2019-06-21 NOTE — PROGRESS NOTES
Endoscope was pre-cleaned at the bedside immediately following procedure by [employer]. Received report from Anesthesia-see anesthesia record for vital signs and medications administered during procedure. Resumed care for vital signs and medications. Clem Ballesteros

## 2019-06-21 NOTE — ANESTHESIA POSTPROCEDURE EVALUATION
Post-Anesthesia Evaluation and Assessment    Patient: Vj Hopkins MRN: [id number]  SSN: [id number]    YOB: 1940  Age: [de-identified] y.o. Sex: male      I have evaluated the patient and they are stable and ready for discharge from the PACU. Cardiovascular Function/Vital Signs  Visit Vitals  /54   Pulse 68   Temp 36.4 °C (97.5 °F)   Resp 18   Ht 5' 11\" (1.803 m)   Wt 77.1 kg (170 lb)   SpO2 96%   BMI 23.71 kg/m²       Patient is status post MAC anesthesia for Procedure(s):  COLONOSCOPY  COLON BIOPSY. Nausea/Vomiting: None    Postoperative hydration reviewed and adequate. Pain:  Pain Scale 1: Numeric (0 - 10) (06/21/19 1745)  Pain Intensity 1: 0 (06/21/19 1745)   Managed    Neurological Status: At baseline    Mental Status, Level of Consciousness: Alert and  oriented to person, place, and time    Pulmonary Status:   O2 Device: Room air (06/21/19 1745)   Adequate oxygenation and airway patent    Complications related to anesthesia: None    Post-anesthesia assessment completed. No concerns    Signed By: Ples Runner, MD     June 21, 2019              Procedure(s):  COLONOSCOPY  COLON BIOPSY. MAC    <BSHSIANPOST>    Vitals Value Taken Time   /78 6/21/2019  5:57 PM   Temp 36.4 °C (97.5 °F) 6/21/2019  5:36 PM   Pulse 70 6/21/2019  5:57 PM   Resp 15 6/21/2019  5:57 PM   SpO2 95 % 6/21/2019  5:57 PM   Vitals shown include unvalidated device data.

## 2019-06-21 NOTE — DISCHARGE INSTRUCTIONS
[facility]    COLON DISCHARGE INSTRUCTIONS    Danyell Shin  [id number]  1940    Discomfort:  Redness at IV site- apply warm compress to area; if redness or soreness persist- contact your physician  There may be a slight amount of blood passed from the rectum  Gaseous discomfort- walking, belching will help relieve any discomfort  You may not operate a vehicle for 12 hours  You may not engage in an occupation involving machinery or appliances for rest of today  You may not drink alcoholic beverages for at least 12 hours  Avoid making any critical decisions for at least 24 hour  DIET:  You may resume your regular diet - however -  remember your colon is empty and a heavy meal will produce gas. Avoid these foods:  vegetables, fried / greasy foods, carbonated drinks     ACTIVITY:  You may  resume your normal daily activities it is recommended that you spend the remainder of the day resting -  avoid any strenuous activity. CALL M.D. ANY SIGN OF:   Increasing pain, nausea, vomiting  Abdominal distension (swelling)  New increased bleeding (oral or rectal)  Fever (chills)  Pain in chest area  Bloody discharge from nose or mouth  Shortness of breath      Follow-up Instructions:   Call Dr. Jennifer Luis for any questions or problems at [address] [place] [address] St:   Your colonoscopy showed 3 polyps, which were removed. We will contact you about the pathology results and when your next colonoscopy will be due. Please maintain a high fiber diet. Telephone # [phone]      Signed By: Brodie Kendall.  London Rick MD     6/21/2019  5:38 PM       DISCHARGE SUMMARY from Nurse    The following personal items collected during your admission are returned to you:   Dental Appliance: Dental Appliances: Lowers, Uppers  Vision: Visual Aid: Glasses  Hearing Aid:    Jewelry:    Clothing:    Other Valuables:    Valuables sent to safe:

## 2019-06-21 NOTE — H&P
[address]  [address]      History and Physical       NAME:  Mariposa Rodrigez   :   1940   MRN:   [id number]             History of Present Illness:  Patient is a [de-identified] y.o. who is seen for personal history of colon polyps. Last colonoscopy was in  and no polyps were removed. PMH:  Past Medical History:   Diagnosis Date    Bradycardia     Coagulation defects     coumadin therapy    Hypertension     Kidney stones     PUD (peptic ulcer disease)     Stroke ([address] ,     blurred vision right eye       PSH:  Past Surgical History:   Procedure Laterality Date    HX GI  2008    colonoscopy    HX PACEMAKER      HX UROLOGICAL      cysto       Allergies:  No Known Allergies    Home Medications:  Prior to Admission Medications   Prescriptions Last Dose Informant Patient Reported? Taking? HYDROcodone-acetaminophen (NORCO) 5-325 mg per tablet 2019 at Unknown time  No Yes   Sig: Take 1 Tab by mouth every four (4) hours as needed. Max Daily Amount: 6 Tabs. Indications: Pain   aspirin 81 mg chewable tablet 6/15/2019  No No   Sig: Take 2 Tabs by mouth daily. carvedilol (COREG) 6.25 mg tablet 2019 at Unknown time  No Yes   Sig: Take 1 Tab by mouth two (2) times daily (with meals). cholecalciferol (VITAMIN D3) 1,000 unit tablet Not Taking at Unknown time  Yes No   Sig: Take  by mouth daily. cholecalciferol, VITAMIN D3, (VITAMIN D3) 5,000 unit tab tablet 2019 at Unknown time  Yes Yes   Sig: Take 40,000 Units by mouth every thirty (30) days. First day of month   cimetidine (TAGAMET) 400 mg tablet 2019 at Unknown time  Yes Yes   Sig: Take 400 mg by mouth daily as needed. isosorbide mononitrate ER (IMDUR) 30 mg tablet 2019 at Unknown time  Yes Yes   Sig: Take 30 mg by mouth daily. pravastatin (PRAVACHOL) 40 mg tablet 2019 at Unknown time  Yes Yes   Sig: Take 40 mg by mouth nightly.    quiNINE (QUALAQUIN) 324 mg capsule Unknown at Unknown time  Yes No   Sig: Take 324 mg by mouth nightly. sacubitril-valsartan (ENTRESTO) 97 mg/103 mg tablet 2019 at Unknown time  Yes Yes   Sig: Take 1 Tab by mouth two (2) times a day. travoprost (TRAVATAN Z) 0.004 % ophthalmic solution 2019 at Unknown time  Yes Yes   Sig: Administer 1 Drop to both eyes every evening. Facility-Administered Medications: None       Hospital Medications:  Current Facility-Administered Medications   Medication Dose Route Frequency    0.9% sodium chloride infusion  50 mL/hr IntraVENous CONTINUOUS    sodium chloride (NS) flush 5-40 mL  5-40 mL IntraVENous Q8H    sodium chloride (NS) flush 5-40 mL  5-40 mL IntraVENous PRN    fentaNYL citrate (PF) injection  mcg   mcg IntraVENous Multiple    naloxone (NARCAN) injection 0.4 mg  0.4 mg IntraVENous Multiple    flumazenil (ROMAZICON) 0.1 mg/mL injection 0.2 mg  0.2 mg IntraVENous Multiple    simethicone (MYLICON) 18AC/1.5BP oral drops 80 mg  1.2 mL Oral Multiple    atropine injection 0.5 mg  0.5 mg IntraVENous ONCE PRN    EPINEPHrine (ADRENALIN) 0.1 mg/mL syringe 1 mg  1 mg Endoscopically ONCE PRN     Facility-Administered Medications Ordered in Other Encounters   Medication Dose Route Frequency    0.9% sodium chloride infusion   IntraVENous CONTINUOUS       Social History:  Social History     Tobacco Use    Smoking status: Former Smoker     Last attempt to quit: 1991     Years since quittin.0    Smokeless tobacco: Never Used   Substance Use Topics    Alcohol use: No     Comment: 1-2 drinks per year       Family History:  History reviewed. No pertinent family history.           Review of Systems:      Constitutional: negative fever, negative chills, negative weight loss  Eyes:   negative visual changes  ENT:   negative sore throat, tongue or lip swelling  Respiratory:  negative cough, negative dyspnea  Cards:  negative for chest pain, palpitations, lower extremity edema  GI:   See HPI  :  negative for frequency, dysuria  Integument:  negative for rash and pruritus  Heme:  negative for easy bruising and gum/nose bleeding  Musculoskel: negative for myalgias,  back pain and muscle weakness  Neuro: negative for headaches, dizziness, vertigo  Psych:  negative for feelings of anxiety, depression       Objective:     Patient Vitals for the past 8 hrs:   BP Temp Pulse Resp SpO2 Height Weight   06/21/19 1647 134/60 97.6 °F (36.4 °C) 60 18 93 % 5' 11\" (1.803 m) 77.1 kg (170 lb)     No intake/output data recorded. No intake/output data recorded. EXAM:     NEURO-a&o   HEENT-wnl   LUNGS-clear    COR-regular rate and rhythym     ABD-soft , no tenderness, no rebound, good bs     EXT-no edema     Data Review     No results for input(s): WBC, HGB, HCT, PLT, HGBEXT, HCTEXT, PLTEXT in the last 72 hours. No results for input(s): NA, K, CL, CO2, BUN, CREA, GLU, PHOS, CA in the last 72 hours. No results for input(s): SGOT, GPT, AP, TBIL, TP, ALB, GLOB, GGT, AML, LPSE in the last 72 hours. No lab exists for component: AMYP, HLPSE  No results for input(s): INR, PTP, APTT in the last 72 hours. 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

## 2021-02-16 ENCOUNTER — APPOINTMENT (OUTPATIENT)
Dept: GENERAL RADIOLOGY | Age: 81
End: 2021-02-16
Attending: INTERNAL MEDICINE
Payer: MEDICARE

## 2021-02-16 ENCOUNTER — HOSPITAL ENCOUNTER (OUTPATIENT)
Age: 81
Setting detail: OUTPATIENT SURGERY
Discharge: HOME | End: 2021-02-16
Attending: INTERNAL MEDICINE | Admitting: INTERNAL MEDICINE
Payer: MEDICARE

## 2021-02-16 VITALS
HEIGHT: 71 IN | HEART RATE: 84 BPM | WEIGHT: 165 LBS | TEMPERATURE: 98 F | DIASTOLIC BLOOD PRESSURE: 93 MMHG | OXYGEN SATURATION: 95 % | BODY MASS INDEX: 23.1 KG/M2 | SYSTOLIC BLOOD PRESSURE: 157 MMHG | RESPIRATION RATE: 20 BRPM

## 2021-02-16 DIAGNOSIS — I47.29 PAROXYSMAL VENTRICULAR TACHYCARDIA: ICD-10-CM

## 2021-02-16 LAB
ATRIAL RATE: 74 BPM
CALCULATED P AXIS, ECG09: 55 DEGREES
CALCULATED R AXIS, ECG10: -80 DEGREES
CALCULATED T AXIS, ECG11: 80 DEGREES
DIAGNOSIS, 93000: NORMAL
P-R INTERVAL, ECG05: 222 MS
Q-T INTERVAL, ECG07: 430 MS
QRS DURATION, ECG06: 186 MS
QTC CALCULATION (BEZET), ECG08: 477 MS
VENTRICULAR RATE, ECG03: 74 BPM

## 2021-02-16 PROCEDURE — C1887 CATHETER, GUIDING: HCPCS | Performed by: INTERNAL MEDICINE

## 2021-02-16 PROCEDURE — 2709999900 HC NON-CHARGEABLE SUPPLY: Performed by: INTERNAL MEDICINE

## 2021-02-16 PROCEDURE — 77030010507 HC ADH SKN DERMBND J&J -B: Performed by: INTERNAL MEDICINE

## 2021-02-16 PROCEDURE — A4565 SLINGS: HCPCS | Performed by: INTERNAL MEDICINE

## 2021-02-16 PROCEDURE — 77030031139 HC SUT VCRL2 J&J -A: Performed by: INTERNAL MEDICINE

## 2021-02-16 PROCEDURE — 74011250636 HC RX REV CODE- 250/636: Performed by: INTERNAL MEDICINE

## 2021-02-16 PROCEDURE — 74011000636 HC RX REV CODE- 636: Performed by: INTERNAL MEDICINE

## 2021-02-16 PROCEDURE — C1777 LEAD, AICD, ENDO SINGLE COIL: HCPCS | Performed by: INTERNAL MEDICINE

## 2021-02-16 PROCEDURE — 33249 INSJ/RPLCMT DEFIB W/LEAD(S): CPT | Performed by: INTERNAL MEDICINE

## 2021-02-16 PROCEDURE — 77030039046 HC PAD DEFIB RADIOTRNSPNT CNMD -B: Performed by: INTERNAL MEDICINE

## 2021-02-16 PROCEDURE — 77030002996 HC SUT SLK J&J -A: Performed by: INTERNAL MEDICINE

## 2021-02-16 PROCEDURE — 71045 X-RAY EXAM CHEST 1 VIEW: CPT

## 2021-02-16 PROCEDURE — 99153 MOD SED SAME PHYS/QHP EA: CPT | Performed by: INTERNAL MEDICINE

## 2021-02-16 PROCEDURE — 74011000250 HC RX REV CODE- 250: Performed by: INTERNAL MEDICINE

## 2021-02-16 PROCEDURE — 93642 EP EVL 1/2CHMB TRNSVNS CVDFB: CPT | Performed by: INTERNAL MEDICINE

## 2021-02-16 PROCEDURE — C1781 MESH (IMPLANTABLE): HCPCS | Performed by: INTERNAL MEDICINE

## 2021-02-16 PROCEDURE — C1892 INTRO/SHEATH,FIXED,PEEL-AWAY: HCPCS | Performed by: INTERNAL MEDICINE

## 2021-02-16 PROCEDURE — 33225 L VENTRIC PACING LEAD ADD-ON: CPT | Performed by: INTERNAL MEDICINE

## 2021-02-16 PROCEDURE — 74011250636 HC RX REV CODE- 250/636

## 2021-02-16 PROCEDURE — 93005 ELECTROCARDIOGRAM TRACING: CPT

## 2021-02-16 PROCEDURE — 74011000250 HC RX REV CODE- 250

## 2021-02-16 PROCEDURE — C1900 LEAD, CORONARY VENOUS: HCPCS | Performed by: INTERNAL MEDICINE

## 2021-02-16 PROCEDURE — C1882 AICD, OTHER THAN SING/DUAL: HCPCS | Performed by: INTERNAL MEDICINE

## 2021-02-16 PROCEDURE — 33229 REMV&REPLC PM GEN MULT LEADS: CPT | Performed by: INTERNAL MEDICINE

## 2021-02-16 PROCEDURE — C1893 INTRO/SHEATH, FIXED,NON-PEEL: HCPCS | Performed by: INTERNAL MEDICINE

## 2021-02-16 PROCEDURE — 77030041075 HC DRSG AG OPTIFRM MDII -B: Performed by: INTERNAL MEDICINE

## 2021-02-16 PROCEDURE — 77030022704 HC SUT VLOC COVD -B: Performed by: INTERNAL MEDICINE

## 2021-02-16 PROCEDURE — 99152 MOD SED SAME PHYS/QHP 5/>YRS: CPT | Performed by: INTERNAL MEDICINE

## 2021-02-16 PROCEDURE — 77030028700 HC BLD TISS PLSM MEDT -E: Performed by: INTERNAL MEDICINE

## 2021-02-16 DEVICE — CRTD DTMA1QQ CLARIA MRI QUAD US DF4
Type: IMPLANTABLE DEVICE | Status: FUNCTIONAL
Brand: CLARIA MRI™ QUAD CRT-D SURESCAN™

## 2021-02-16 DEVICE — LEAD 6935M62 QUATTRO SECURE S MRI US
Type: IMPLANTABLE DEVICE | Status: FUNCTIONAL
Brand: SPRINT QUATTRO SECURE S MRI™ SURESCAN™

## 2021-02-16 DEVICE — ENVELOPE CMRM6133 ABSORB LRG MR
Type: IMPLANTABLE DEVICE | Status: FUNCTIONAL
Brand: TYRX™

## 2021-02-16 DEVICE — LEAD 439888 MRI STRAIGHT US
Type: IMPLANTABLE DEVICE | Status: FUNCTIONAL
Brand: ATTAIN PERFORMA™ STRAIGHT MRI SURESCAN™

## 2021-02-16 RX ORDER — CEFAZOLIN SODIUM/WATER 2 G/20 ML
2 SYRINGE (ML) INTRAVENOUS ONCE
Status: DISCONTINUED | OUTPATIENT
Start: 2021-02-16 | End: 2021-02-16 | Stop reason: SDUPTHER

## 2021-02-16 RX ORDER — MIDAZOLAM HYDROCHLORIDE 1 MG/ML
INJECTION, SOLUTION INTRAMUSCULAR; INTRAVENOUS AS NEEDED
Status: DISCONTINUED | OUTPATIENT
Start: 2021-02-16 | End: 2021-02-16 | Stop reason: HOSPADM

## 2021-02-16 RX ORDER — DIPHENHYDRAMINE HYDROCHLORIDE 50 MG/ML
INJECTION, SOLUTION INTRAMUSCULAR; INTRAVENOUS AS NEEDED
Status: DISCONTINUED | OUTPATIENT
Start: 2021-02-16 | End: 2021-02-16 | Stop reason: HOSPADM

## 2021-02-16 RX ORDER — SODIUM CHLORIDE 0.9 % (FLUSH) 0.9 %
5-40 SYRINGE (ML) INJECTION EVERY 8 HOURS
Status: DISCONTINUED | OUTPATIENT
Start: 2021-02-16 | End: 2021-02-16 | Stop reason: HOSPADM

## 2021-02-16 RX ORDER — SODIUM CHLORIDE 0.9 % (FLUSH) 0.9 %
5-40 SYRINGE (ML) INJECTION AS NEEDED
Status: DISCONTINUED | OUTPATIENT
Start: 2021-02-16 | End: 2021-02-16 | Stop reason: HOSPADM

## 2021-02-16 RX ORDER — CEFAZOLIN SODIUM/WATER 2 G/20 ML
SYRINGE (ML) INTRAVENOUS AS NEEDED
Status: DISCONTINUED | OUTPATIENT
Start: 2021-02-16 | End: 2021-02-16 | Stop reason: HOSPADM

## 2021-02-16 RX ORDER — FENTANYL CITRATE 50 UG/ML
INJECTION, SOLUTION INTRAMUSCULAR; INTRAVENOUS AS NEEDED
Status: DISCONTINUED | OUTPATIENT
Start: 2021-02-16 | End: 2021-02-16 | Stop reason: HOSPADM

## 2021-02-16 RX ORDER — LIDOCAINE HYDROCHLORIDE 10 MG/ML
INJECTION INFILTRATION; PERINEURAL AS NEEDED
Status: DISCONTINUED | OUTPATIENT
Start: 2021-02-16 | End: 2021-02-16 | Stop reason: HOSPADM

## 2021-02-16 RX ORDER — HYDROCODONE BITARTRATE AND ACETAMINOPHEN 5; 325 MG/1; MG/1
1 TABLET ORAL
Status: DISCONTINUED | OUTPATIENT
Start: 2021-02-16 | End: 2021-02-16 | Stop reason: HOSPADM

## 2021-02-16 NOTE — Clinical Note
TRANSFER - IN REPORT:     Verbal report received from: Gal. Report consisted of patient's Situation, Background, Assessment and   Recommendations(SBAR). Opportunity for questions and clarification was provided. Assessment completed upon patient's arrival to unit and care assumed. Patient transported with a Registered Nurse.

## 2021-02-16 NOTE — DISCHARGE INSTRUCTIONS
Pacemaker and ICD incision care and Discharge Instructions    Limitations and Precautions    Do not life the affected arm over your head for the next 2 weeks. If you are given a sling, only use it for 2-3 days. The sling is just a comfort measure and a remind to not lift your arm. Do not lift anything heavy for 2 weeks. Restriction of 10 lbs total weight. For example 1 six pack of 12 oz soda weighs 4 lbs. 1 gallon of milk weighs 8.5 lbs. Some swelling, redness, and pain are common with all incisions and normally will go away as the incision heals. If swelling, redness, or pain increases or if the area feels warm to touch contact a doctor. Also contact a doctor if the incision edges reopen or separate, if any drainage is noted or if you have a high fever , > 101 degrees. Caring for your Incision    · Surgical Glue was used over your incision  1. If a small dressing is over your incision, you may remove it one day after your procedure. 2. Surgical glue has been applied directly on the incision. This will be shiny in appearance. 3. 24 hours after your surgery, you may briefly wet your incision in the shower or bath. Do not soak or scrub your incision. After showering or bathing, gently blot your wound dry with a soft towel. 4. Do not scratch, rub, or pick at the adhesive film. This may loosen the film before your incision has healed. 5. Protect the incision from prolonged exposure to sunlight or tanning lamps while the film in place. 6. Do not apply liquid or ointment medications or any other product to your incision while the adhesive film is in place. These may loosen the film before your wound is healed. 7. The adhesive film will start to flake off in a week or two. Information about your Pacemaker/ICD    Use with Caution:  · You may  safely use a cell phone on the side opposite from your device. · Keep a cell phone 6 inches from your device.   · Keep any small electrical devices such as an I-pod 6 inches from your device  · You CAN   HAVE A MRI SCAN    · Do not use a TENS unit or magnetic therapy application near your device. Check with your nurse to be approved. · Use of lawn equipment or power tools; Keep all equipment at arms length. Other Information:  · Keep your ID card with you at all times. · You may use a microwave oven and any other household appliances. · You may walk through a metal detector. It will not harm your device; however, it may cause the security arm to sound  · You may have an X-ray or CAT Scan.  · If you undergo any type of procedure or surgery, notify the physician of your device. It may need to be adjusted prior to the procedure.   · We recommend purchasing a Medic Alert bracelet or necklace    If you have further questions you may call your physician's office

## 2021-02-16 NOTE — PROGRESS NOTES
TRANSFER - IN REPORT:    Verbal report received from Candelaria Carrillo CVT on Yung Coil  being received from procedure area for routine progression of care. Report consisted of patients Situation, Background, Assessment and Recommendations(SBAR). Information from the following report(s) Kardex, Procedure Summary, MAR, Recent Results and Cardiac Rhythm Paced was reviewed with the receiving clinician. Opportunity for questions and clarification was provided. Assessment completed upon patients arrival to [address] [place] [address] and care assumed. Cardiac Cath Lab Recovery Arrival Note:    Yung Coil arrived to [facility] recovery area. Patient procedure= BIVI? ICD upgrade. Patient on cardiac monitor, non-invasive blood pressure, SPO2 monitor. On Room Air . IV  of NS on pump at 25 ml/hr. Patient status doing well without problems. Patient is sleeping/sedated/rousable to name Patient reports No Pain. PROCEDURE SITE CHECK:    Procedure site:without any bleeding and No Hematoma, No pain/discomfort reported at procedure site. No change in patient status. Continue to monitor patient and status.

## 2021-02-16 NOTE — PROGRESS NOTES
TRANSFER - OUT REPORT:    Verbal report given to Amanda Boucher RN on Jovon Ngo being transferred to cath lab recovery for routine progression of care       Report consisted of patients Situation, Background, Assessment and   Recommendations(SBAR). Information from the following report(s) Procedure Summary was reviewed with the receiving nurse. Opportunity for questions and clarification was provided. Cardiac Cath Lab Procedure Area Arrival Note:    Jovon Ngo arrived to Cardiac Cath Lab, Procedure Area. Patient identifiers verified with NAME and DATE OF BIRTH. Procedure verified with patient. Consent forms verified. Allergies verified. Patient informed of procedure and plan of care. Questions answered with review. Patient voiced understanding of procedure and plan of care. Patient on cardiac monitor, non-invasive blood pressure, SPO2 monitor. On  or O2 @ 2 lpm via nasal canula. IV of 0.9% NS on pump at 25 ml/hr. Patient status doing well without problems. Patient is A&Ox 4. Patient reports no pain. Patient medicated during procedure with orders obtained and verified by Dr. Frank Spicer. Refer to patients Cardiac Cath Lab PROCEDURE REPORT for vital signs, assessment, status, and response during procedure, printed at end of case. Printed report on chart or scanned into chart.

## 2021-02-16 NOTE — PROCEDURES
2/16/2021    PROCEDURE:  BIVICD implant,  Removal of pacemaker,  Extraction of RV lead     Indication: NYHA class III CHF                   LBBB qrsd 200 msec         LV systolic dysfunction  LVEF 35%  nsvt     PROCEDURES PERFORMED:  1. Removal of pacemaker Advisa [id number] .  2. Removal of RV chronic RV lead 5076   3. Permanent Biventricular Implantable Cardioverter Defibrillator (ICD) Implantation:      A: Medtronic  ICD model  Claria [id number]      B: Placement of ventricular lead with threshold testing. Lead: 6935M       C: retained  atrial lead  Lead 5076 implanted in 8/6/2018        D. Placement of LV lead 4398  4. ICD defribrillation threshold testing by the step down method  5. Peripheral venogram     COMPLICATIONS:None. ESTIMATED BLOOD LOSS: Minimal  SPECIMENS: None  ASSISTANTS: See Vanesa    PROCEDURAL NOTE:  The patient was brought to the laboratory in a sedated postabsorptive state. The left chest wall was prepped and draped in the usual fashion and anesthetized with 20 mL of 2% lidocaine. Incision was made over the deltopectoral groove and extended to the level of the pectoralis fascia. A pocket was made anterior to the pectoralis fascia for in which to implant the device and included the previous pacemaker site. The pacemaker was removed   ([place] BAKER  [id number])   . A venogram was performed which demonstrated patency of the left axillary , innominate and subclavian veins with stenosis at the innominate svc junction. The left axillary vein was cannulated by the Seldinger technique under fluorscopic guidance with a micropuncture needle. A guide wire was advanced into the central circulation followed by a tear away sheath. The ventricular lead was inserted inserted through the tear away sheath  and advanced to the right ventricular apex. The tear away sheath was removed. Threshold testing was performed.  Once adequate position was obtained the peel-away sheath was removed and the lead was fixed with 2, 0-silk ties around the tie down sheath. The chronic rv lead was dissected out and the RV tie down was removed. A stilette was placed in the rv lead and the active fixation mechanism was retracted. With gentle traction the rv lead was removed. Through the guide wire placed in the subclavian  vein, a tear away sheath was placed followed by a Medtronic Attain guiding sheath. This sheath was placed into the coronary sinus. A coronary sinus venogram was performed. The posterior lateral branch on the coronary sinus was accessed with a selective sheath. A Whisper guide wire was advanced into the vein and over the wire the lv lead was placed into the most secure position which had the best capture thresholds no diaphragmatic simulation at ventricular outputs > 10 ma. Once adequate position was confirmed, the guiding and tear away sheaths were removed under fluroscopic guidance and the lead was fixed with 2, 0-silk ties around the tie down sheath. The ICD pocket was flushed with   sterile saline  solution. The leads were then attached to generator. Leads and generator placed in the pocket with the leads posterior to the generator in an antibiotic eluting pouch. Subcutaneous tissue closed in 2 layers utilizing #2-0 Vicryl. Skin closed with dermabond glue with an excellent final result. Defibrillation threshold test was performed by the step down method with a threshold of 15J . The device was programed to > 2 times the defibrillation threshold. Total contrast 45 cc. The patient was transferred to the holding area    No complications were noted. Kriss Vieira IMPRESSION AND RECOMMENDATION:  The patient will be followed up in the [address] [place] [address].

## 2021-02-16 NOTE — PROGRESS NOTES
Ice pack placed too left chest surgical site. Chest XRAY done. Pt tolerated well. Left arm sling intact.

## 2021-02-16 NOTE — PROGRESS NOTES
Pt verbalized understanding of discharge instructions. PIV removed and guaze with tape placed to site; no bleeding or redness. Pt escorted to car via wheelchair with belongings. Wife is driving.

## 2021-02-16 NOTE — Clinical Note
Sheath: inserted. Sheath inserted/placed in the left subclavian  vein. Upon evaluation of the common femoral artery stick using fluoroscopy, the access site puncture was within the safe zone.

## 2021-02-16 NOTE — Clinical Note
A Bovie was used. Blend setting: blend. Mode: bipolar. Coagulation Settin. Cut Settin. Site (pad location): lateral thigh. Laterality: right.

## 2021-02-16 NOTE — PROGRESS NOTES
Cardiac Cath Lab Recovery Arrival Note:      Lul Alvarenga arrived to Cardiac Cath Lab, Recovery Area. Staff introduced to patient. Patient identifiers verified with NAME and DATE OF BIRTH. Procedure verified with patient. Consent forms reviewed and signed by patient or authorized representative and verified. Allergies verified. Patient and family oriented to department. Patient and family informed of procedure and plan of care. Questions answered with review. Patient prepped for procedure, per orders from physician, prior to arrival.    Patient on cardiac monitor, non-invasive blood pressure, SPO2 monitor. On Room air. Patient is A&Ox 4. Patient reports Nom Pain. Patient in stretcher, in low position, with side rails up, call bell within reach, patient instructed to call if assistance as needed. Patient prep in: [facility] Recovery Area, [place]  Family in: Waiting Room.    Prep by: Anton Orellana RN

## 2021-02-16 NOTE — DISCHARGE SUMMARY
CARDIOLOGY SHORT STAY D/C    Patient stable after removal of pacemaker and upgrade to biv icd     Visit Vitals  BP (!) 152/90 (BP 1 Location: Right upper arm)   Pulse 75   Temp 98.6 °F (37 °C)   Resp 14   Ht 5' 11\" (1.803 m)   Wt 74.8 kg (165 lb)   SpO2 96%   BMI 23.01 kg/m²     Cardiovascular: regular rate and rhythm, no edema, no murmurs  Respiratory: clear to ascultation bilaterally  Musculoskeletal: Incision ok, groin sites ok  Interrogation: Ok  Tele: NSR biv pacing     PLAN    1) Discharge to home in stable condition    2)  Followup as below:    2 weeks     3) Discharge meds as below:    Current Discharge Medication List      CONTINUE these medications which have NOT CHANGED    Details   carvedilol (COREG) 6.25 mg tablet Take 1 Tab by mouth two (2) times daily (with meals). Qty: 60 Tab, Refills: 5      quiNINE (QUALAQUIN) 324 mg capsule Take 324 mg by mouth nightly. isosorbide mononitrate ER (IMDUR) 30 mg tablet Take 30 mg by mouth daily. pravastatin (PRAVACHOL) 40 mg tablet Take 40 mg by mouth nightly. travoprost (TRAVATAN Z) 0.004 % ophthalmic solution Administer 1 Drop to both eyes every evening. sacubitril-valsartan (ENTRESTO) 97 mg/103 mg tablet Take 1 Tab by mouth two (2) times a day. aspirin 81 mg chewable tablet Take 2 Tabs by mouth daily. Qty: 100 Tab, Refills: 12      HYDROcodone-acetaminophen (NORCO) 5-325 mg per tablet Take 1 Tab by mouth every four (4) hours as needed. Max Daily Amount: 6 Tabs. Indications: Pain  Qty: 30 Tab, Refills: 0    Associated Diagnoses: Post-op pain      cimetidine (TAGAMET) 400 mg tablet Take 400 mg by mouth daily as needed. cholecalciferol, VITAMIN D3, (VITAMIN D3) 5,000 unit tab tablet Take 40,000 Units by mouth every thirty (30) days. First day of month      cholecalciferol (VITAMIN D3) 1,000 unit tablet Take  by mouth daily.

## 2021-10-06 ENCOUNTER — HOSPITAL ENCOUNTER (OUTPATIENT)
Dept: GENERAL RADIOLOGY | Age: 81
Discharge: HOME | End: 2021-10-06
Payer: MEDICARE

## 2021-10-06 ENCOUNTER — TRANSCRIBE ORDER (OUTPATIENT)
Dept: REGISTRATION | Age: 81
End: 2021-10-06

## 2021-10-06 DIAGNOSIS — R10.9 ABDOMINAL PAIN: ICD-10-CM

## 2021-10-06 DIAGNOSIS — R10.9 ABDOMINAL PAIN: Primary | ICD-10-CM

## 2021-10-06 PROCEDURE — 74018 RADEX ABDOMEN 1 VIEW: CPT

## 2021-11-08 ENCOUNTER — APPOINTMENT (OUTPATIENT)
Dept: GENERAL RADIOLOGY | Age: 81
End: 2021-11-08
Attending: STUDENT IN AN ORGANIZED HEALTH CARE EDUCATION/TRAINING PROGRAM
Payer: MEDICARE

## 2021-11-08 ENCOUNTER — APPOINTMENT (OUTPATIENT)
Dept: CT IMAGING | Age: 81
End: 2021-11-08
Attending: STUDENT IN AN ORGANIZED HEALTH CARE EDUCATION/TRAINING PROGRAM
Payer: MEDICARE

## 2021-11-08 ENCOUNTER — HOSPITAL ENCOUNTER (EMERGENCY)
Age: 81
Discharge: ANOTHER HOSPITAL | End: 2021-11-08
Attending: STUDENT IN AN ORGANIZED HEALTH CARE EDUCATION/TRAINING PROGRAM
Payer: MEDICARE

## 2021-11-08 VITALS
HEART RATE: 91 BPM | SYSTOLIC BLOOD PRESSURE: 130 MMHG | OXYGEN SATURATION: 100 % | TEMPERATURE: 98.1 F | RESPIRATION RATE: 15 BRPM | DIASTOLIC BLOOD PRESSURE: 70 MMHG | HEIGHT: 71 IN | WEIGHT: 103.4 LBS | BODY MASS INDEX: 14.48 KG/M2

## 2021-11-08 DIAGNOSIS — S27.321A CONTUSION OF RIGHT LUNG, INITIAL ENCOUNTER: Primary | ICD-10-CM

## 2021-11-08 DIAGNOSIS — S22.41XA CLOSED FRACTURE OF MULTIPLE RIBS OF RIGHT SIDE, INITIAL ENCOUNTER: ICD-10-CM

## 2021-11-08 DIAGNOSIS — R77.8 ELEVATED TROPONIN: ICD-10-CM

## 2021-11-08 LAB
ALBUMIN SERPL-MCNC: 2.8 G/DL (ref 3.5–5)
ALBUMIN/GLOB SERPL: 0.5 {RATIO} (ref 1.1–2.2)
ALP SERPL-CCNC: 89 U/L (ref 45–117)
ALT SERPL-CCNC: 20 U/L (ref 12–78)
ANION GAP SERPL CALC-SCNC: 8 MMOL/L (ref 5–15)
AST SERPL-CCNC: 31 U/L (ref 15–37)
BASOPHILS # BLD: 0 K/UL (ref 0–0.1)
BASOPHILS NFR BLD: 0 % (ref 0–1)
BILIRUB SERPL-MCNC: 0.6 MG/DL (ref 0.2–1)
BUN SERPL-MCNC: 19 MG/DL (ref 6–20)
BUN/CREAT SERPL: 15 (ref 12–20)
CALCIUM SERPL-MCNC: 11.7 MG/DL (ref 8.5–10.1)
CHLORIDE SERPL-SCNC: 100 MMOL/L (ref 97–108)
CO2 SERPL-SCNC: 29 MMOL/L (ref 21–32)
COMMENT, HOLDF: NORMAL
CREAT SERPL-MCNC: 1.24 MG/DL (ref 0.7–1.3)
DIFFERENTIAL METHOD BLD: ABNORMAL
EOSINOPHIL # BLD: 0.1 K/UL (ref 0–0.4)
EOSINOPHIL NFR BLD: 1 % (ref 0–7)
ERYTHROCYTE [DISTWIDTH] IN BLOOD BY AUTOMATED COUNT: 15.3 % (ref 11.5–14.5)
GLOBULIN SER CALC-MCNC: 5.5 G/DL (ref 2–4)
GLUCOSE SERPL-MCNC: 114 MG/DL (ref 65–100)
HCT VFR BLD AUTO: 41.2 % (ref 36.6–50.3)
HGB BLD-MCNC: 12.4 G/DL (ref 12.1–17)
IMM GRANULOCYTES # BLD AUTO: 0.1 K/UL (ref 0–0.04)
IMM GRANULOCYTES NFR BLD AUTO: 1 % (ref 0–0.5)
LYMPHOCYTES # BLD: 2.1 K/UL (ref 0.8–3.5)
LYMPHOCYTES NFR BLD: 16 % (ref 12–49)
MCH RBC QN AUTO: 25.7 PG (ref 26–34)
MCHC RBC AUTO-ENTMCNC: 30.1 G/DL (ref 30–36.5)
MCV RBC AUTO: 85.5 FL (ref 80–99)
MONOCYTES # BLD: 1.3 K/UL (ref 0–1)
MONOCYTES NFR BLD: 10 % (ref 5–13)
NEUTS SEG # BLD: 9.8 K/UL (ref 1.8–8)
NEUTS SEG NFR BLD: 72 % (ref 32–75)
NRBC # BLD: 0 K/UL (ref 0–0.01)
NRBC BLD-RTO: 0 PER 100 WBC
PLATELET # BLD AUTO: 248 K/UL (ref 150–400)
PMV BLD AUTO: 9.3 FL (ref 8.9–12.9)
POTASSIUM SERPL-SCNC: 3.1 MMOL/L (ref 3.5–5.1)
PROT SERPL-MCNC: 8.3 G/DL (ref 6.4–8.2)
RBC # BLD AUTO: 4.82 M/UL (ref 4.1–5.7)
SAMPLES BEING HELD,HOLD: NORMAL
SODIUM SERPL-SCNC: 137 MMOL/L (ref 136–145)
TROPONIN-HIGH SENSITIVITY: 154 NG/L (ref 0–76)
WBC # BLD AUTO: 13.3 K/UL (ref 4.1–11.1)

## 2021-11-08 PROCEDURE — 84484 ASSAY OF TROPONIN QUANT: CPT

## 2021-11-08 PROCEDURE — 85025 COMPLETE CBC W/AUTO DIFF WBC: CPT

## 2021-11-08 PROCEDURE — 71101 X-RAY EXAM UNILAT RIBS/CHEST: CPT

## 2021-11-08 PROCEDURE — 80053 COMPREHEN METABOLIC PANEL: CPT

## 2021-11-08 PROCEDURE — 72125 CT NECK SPINE W/O DYE: CPT

## 2021-11-08 PROCEDURE — 36415 COLL VENOUS BLD VENIPUNCTURE: CPT

## 2021-11-08 PROCEDURE — 99285 EMERGENCY DEPT VISIT HI MDM: CPT

## 2021-11-08 PROCEDURE — 93005 ELECTROCARDIOGRAM TRACING: CPT

## 2021-11-08 PROCEDURE — 70450 CT HEAD/BRAIN W/O DYE: CPT
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
## 2021-11-09 LAB
ATRIAL RATE: 114 BPM
CALCULATED P AXIS, ECG09: 79 DEGREES
CALCULATED R AXIS, ECG10: -90 DEGREES
CALCULATED T AXIS, ECG11: 63 DEGREES
DIAGNOSIS, 93000: NORMAL
P-R INTERVAL, ECG05: 130 MS
Q-T INTERVAL, ECG07: 366 MS
QRS DURATION, ECG06: 116 MS
QTC CALCULATION (BEZET), ECG08: 504 MS
VENTRICULAR RATE, ECG03: 114 BPM
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
## (undated) DEVICE — Device: Brand: MEDICAL ACTION INDUSTRIES

## (undated) DEVICE — SOLIDIFIER FLUID 3000 CC ABSORB

## (undated) DEVICE — Z CONVERTED USE 2274299 CUFF BLD PRESSURE LNG MED AD 25-35 CM ARM FLEXIPORT DISP

## (undated) DEVICE — AIRLIFE™ U/CONNECT-IT OXYGEN TUBING 7 FEET (2.1 M) CRUSH-RESISTANT OXYGEN TUBING, VINYL TIPPED: Brand: AIRLIFE™

## (undated) DEVICE — BW-412T DISP COMBO CLEANING BRUSH: Brand: SINGLE USE COMBINATION CLEANING BRUSH

## (undated) DEVICE — ENDO CARRY-ON PROCEDURE KIT INCLUDES ENZYMATIC SPONGE, GAUZE, BIOHAZARD LABEL, TRAY, LUBRICANT, DIRTY SCOPE LABEL, WATER LABEL, TRAY, DRAWSTRING PAD, AND DEFENDO 4-PIECE KIT.: Brand: ENDO CARRY-ON PROCEDURE KIT

## (undated) DEVICE — 1200 GUARD II KIT W/5MM TUBE W/O VAC TUBE: Brand: GUARDIAN

## (undated) DEVICE — Z DISCONTINUED NO SUB IDED SET EXTN W/ 4 W STPCOCK M SPIN LOK 36IN

## (undated) DEVICE — PLASMABLADE PS210-030S 3.0S LOCK: Brand: PLASMABLADE™

## (undated) DEVICE — MICROPUNCTURE INTRODUCER SET SILHOUETTE TRANSITIONLESS WITH STAINLESS STEEL WIRE GUIDE: Brand: MICROPUNCTURE

## (undated) DEVICE — Z DISCONTINUED USE 2751540 TUBING IRRIG L10IN DISP PMP ENDOGATOR

## (undated) DEVICE — KENDALL RADIOLUCENT FOAM MONITORING ELECTRODE -RECTANGULAR SHAPE: Brand: KENDALL

## (undated) DEVICE — CANN NASAL O2 CAPNOGRAPHY AD -- FILTERLINE

## (undated) DEVICE — SUT SLK 0 30IN CT1 BLK --

## (undated) DEVICE — CATH IV AUTOGRD BC BLU 22GA 25 -- INSYTE

## (undated) DEVICE — INTRO SHTH 9FR 13X20CM -- USE ITEM# 341577

## (undated) DEVICE — HANDLE LT SNAP ON ULT DURABLE LENS FOR TRUMPF ALC DISPOSABLE

## (undated) DEVICE — SET ADMIN 16ML TBNG L100IN 2 Y INJ SITE IV PIGGY BK DISP

## (undated) DEVICE — DERMABOND SKIN ADH 0.7ML -- DERMABOND ADVANCED 12/BX

## (undated) DEVICE — SUTURE PERMAHAND SZ 0 L18IN NONABSORBABLE BLK SILK BRAID A186H

## (undated) DEVICE — BAG SPEC BIOHZD LF 2MIL 6X10IN -- CONVERT TO ITEM 357326

## (undated) DEVICE — Device: Brand: PADPRO

## (undated) DEVICE — SYSTEM INTRO 10.5FR L13CM STD WHT CAP HEMSTAT SPLITTABLE

## (undated) DEVICE — SUTURE DEV SZ 3-0 V-LOC 90 L12IN TO L18IN CV-23 VLT VLOCM0844

## (undated) DEVICE — CONTAINER SPEC 20 ML LID NEUT BUFF FORMALIN 10 % POLYPR STS

## (undated) DEVICE — SYRINGE MED 20ML STD CLR PLAS LUERLOCK TIP N CTRL DISP

## (undated) DEVICE — CONNECTOR TBNG AUX H2O JET DISP FOR OLY 160/180 SER

## (undated) DEVICE — SUTURE VCRL SZ 2-0 L36IN ABSRB UD L40MM CT 1/2 CIR J957H

## (undated) DEVICE — BAG BELONG PT PERS CLEAR HANDL

## (undated) DEVICE — DRESSING ANTIMIC FOAM OPTIFOAM POSTOP ADH 4 X 6 IN

## (undated) DEVICE — NEEDLE HYPO 18GA L1.5IN PNK S STL HUB POLYPR SHLD REG BVL

## (undated) DEVICE — STERILE POLYISOPRENE POWDER-FREE SURGICAL GLOVES: Brand: PROTEXIS

## (undated) DEVICE — SLING ORTHOPEDIC PCH UNIV 19.5X9 IN 2-39 IN ARM W/ FOAM STRP

## (undated) DEVICE — 3M™ IOBAN™ 2 ANTIMICROBIAL INCISE DRAPE 6650EZ: Brand: IOBAN™ 2

## (undated) DEVICE — QUILTED PREMIUM COMFORT UNDERPAD,EXTRA HEAVY: Brand: WINGS

## (undated) DEVICE — SYS LEAD DELIVERY DEF04 --

## (undated) DEVICE — FORCEPS BX L240CM JAW DIA2.8MM L CAP W/ NDL MIC MESH TOOTH

## (undated) DEVICE — REM POLYHESIVE ADULT PATIENT RETURN ELECTRODE: Brand: VALLEYLAB

## (undated) DEVICE — LIMB HOLDER, WRIST/ANKLE: Brand: DEROYAL